# Patient Record
Sex: FEMALE | Race: BLACK OR AFRICAN AMERICAN | NOT HISPANIC OR LATINO | Employment: FULL TIME | URBAN - METROPOLITAN AREA
[De-identification: names, ages, dates, MRNs, and addresses within clinical notes are randomized per-mention and may not be internally consistent; named-entity substitution may affect disease eponyms.]

---

## 2022-12-14 ENCOUNTER — OFFICE VISIT (OUTPATIENT)
Dept: OBSTETRICS AND GYNECOLOGY | Facility: CLINIC | Age: 32
End: 2022-12-14
Payer: MEDICAID

## 2022-12-14 VITALS
HEART RATE: 61 BPM | BODY MASS INDEX: 20.6 KG/M2 | SYSTOLIC BLOOD PRESSURE: 100 MMHG | WEIGHT: 123.63 LBS | DIASTOLIC BLOOD PRESSURE: 63 MMHG | HEIGHT: 65 IN

## 2022-12-14 DIAGNOSIS — Z30.011 ENCOUNTER FOR INITIAL PRESCRIPTION OF CONTRACEPTIVE PILLS: Primary | ICD-10-CM

## 2022-12-14 PROCEDURE — 3078F PR MOST RECENT DIASTOLIC BLOOD PRESSURE < 80 MM HG: ICD-10-PCS | Mod: CPTII,S$GLB,, | Performed by: OBSTETRICS & GYNECOLOGY

## 2022-12-14 PROCEDURE — 3074F SYST BP LT 130 MM HG: CPT | Mod: CPTII,S$GLB,, | Performed by: OBSTETRICS & GYNECOLOGY

## 2022-12-14 PROCEDURE — 3008F BODY MASS INDEX DOCD: CPT | Mod: CPTII,S$GLB,, | Performed by: OBSTETRICS & GYNECOLOGY

## 2022-12-14 PROCEDURE — 3008F PR BODY MASS INDEX (BMI) DOCUMENTED: ICD-10-PCS | Mod: CPTII,S$GLB,, | Performed by: OBSTETRICS & GYNECOLOGY

## 2022-12-14 PROCEDURE — 99202 PR OFFICE/OUTPT VISIT, NEW, LEVL II, 15-29 MIN: ICD-10-PCS | Mod: S$GLB,,, | Performed by: OBSTETRICS & GYNECOLOGY

## 2022-12-14 PROCEDURE — 99202 OFFICE O/P NEW SF 15 MIN: CPT | Mod: S$GLB,,, | Performed by: OBSTETRICS & GYNECOLOGY

## 2022-12-14 PROCEDURE — 1159F MED LIST DOCD IN RCRD: CPT | Mod: CPTII,S$GLB,, | Performed by: OBSTETRICS & GYNECOLOGY

## 2022-12-14 PROCEDURE — 3078F DIAST BP <80 MM HG: CPT | Mod: CPTII,S$GLB,, | Performed by: OBSTETRICS & GYNECOLOGY

## 2022-12-14 PROCEDURE — 1159F PR MEDICATION LIST DOCUMENTED IN MEDICAL RECORD: ICD-10-PCS | Mod: CPTII,S$GLB,, | Performed by: OBSTETRICS & GYNECOLOGY

## 2022-12-14 PROCEDURE — 3074F PR MOST RECENT SYSTOLIC BLOOD PRESSURE < 130 MM HG: ICD-10-PCS | Mod: CPTII,S$GLB,, | Performed by: OBSTETRICS & GYNECOLOGY

## 2022-12-14 RX ORDER — NORETHINDRONE ACETATE AND ETHINYL ESTRADIOL 1MG-20(21)
1 KIT ORAL DAILY
Qty: 30 TABLET | Refills: 11 | Status: SHIPPED | OUTPATIENT
Start: 2022-12-14 | End: 2023-02-01 | Stop reason: SDUPTHER

## 2022-12-14 NOTE — PROGRESS NOTES
32-year-old female who is just had the Nexplanon removed wants to start on birth control pills just started her period 2 days ago I will put her on  balcoltra and will see her back when she is due for Pap she has no contraindications to the pill literature was given  Answers submitted by the patient for this visit:  Vaginal Discharge Questionnaire  (Submitted on 2022)  Chief Complaint: Vaginal discharge  Chronicity: recurrent  Onset: more than 1 month ago  Frequency: daily  Progression since onset: unchanged  Pain severity: no pain  Pregnant now?: No  abdominal pain: Yes  anorexia: No  back pain: No  chills: No  constipation: Yes  diarrhea: No  discolored urine: No  dysuria: No  fever: No  flank pain: Yes  frequency: No  headaches: No  hematuria: No  nausea: No  painful intercourse: No  rash: No  urgency: No  vomiting: No  Vaginal bleeding: typical of menses  Passing clots?: Yes  Passing tissue?: No  Aggravated by: nothing  treatments tried: heating pad, NSAIDs, warm bath  Improvement on treatment: no relief  Sexual activity: sexually active  Partner with STD symptoms: unknown  Birth control: condoms  Menstrual history: regular  STD: No  abdominal surgery: No   section: Yes  Ectopic pregnancy: No  Endometriosis: No  herpes simplex: No  gynecological surgery: No  menorrhagia: Yes  metrorrhagia: No  miscarriage: No  ovarian cysts: No  perineal abscess: No  PID: No  terminated pregnancy: No  vaginosis: No

## 2023-02-01 ENCOUNTER — OFFICE VISIT (OUTPATIENT)
Dept: OBSTETRICS AND GYNECOLOGY | Facility: CLINIC | Age: 33
End: 2023-02-01
Payer: MEDICAID

## 2023-02-01 VITALS
SYSTOLIC BLOOD PRESSURE: 115 MMHG | BODY MASS INDEX: 21.3 KG/M2 | WEIGHT: 128 LBS | DIASTOLIC BLOOD PRESSURE: 74 MMHG | HEART RATE: 90 BPM

## 2023-02-01 DIAGNOSIS — Z12.4 SCREENING FOR MALIGNANT NEOPLASM OF THE CERVIX: ICD-10-CM

## 2023-02-01 DIAGNOSIS — Z00.00 PHYSICAL EXAM, ANNUAL: ICD-10-CM

## 2023-02-01 DIAGNOSIS — Z01.419 WELL WOMAN EXAM WITH ROUTINE GYNECOLOGICAL EXAM: Primary | ICD-10-CM

## 2023-02-01 DIAGNOSIS — N76.0 VAGINOSIS: ICD-10-CM

## 2023-02-01 PROCEDURE — 3074F PR MOST RECENT SYSTOLIC BLOOD PRESSURE < 130 MM HG: ICD-10-PCS | Mod: CPTII,S$GLB,, | Performed by: OBSTETRICS & GYNECOLOGY

## 2023-02-01 PROCEDURE — 1159F PR MEDICATION LIST DOCUMENTED IN MEDICAL RECORD: ICD-10-PCS | Mod: CPTII,S$GLB,, | Performed by: OBSTETRICS & GYNECOLOGY

## 2023-02-01 PROCEDURE — 3008F BODY MASS INDEX DOCD: CPT | Mod: CPTII,S$GLB,, | Performed by: OBSTETRICS & GYNECOLOGY

## 2023-02-01 PROCEDURE — 1159F MED LIST DOCD IN RCRD: CPT | Mod: CPTII,S$GLB,, | Performed by: OBSTETRICS & GYNECOLOGY

## 2023-02-01 PROCEDURE — 3008F PR BODY MASS INDEX (BMI) DOCUMENTED: ICD-10-PCS | Mod: CPTII,S$GLB,, | Performed by: OBSTETRICS & GYNECOLOGY

## 2023-02-01 PROCEDURE — 99395 PR PREVENTIVE VISIT,EST,18-39: ICD-10-PCS | Mod: S$GLB,,, | Performed by: OBSTETRICS & GYNECOLOGY

## 2023-02-01 PROCEDURE — 3074F SYST BP LT 130 MM HG: CPT | Mod: CPTII,S$GLB,, | Performed by: OBSTETRICS & GYNECOLOGY

## 2023-02-01 PROCEDURE — 3078F PR MOST RECENT DIASTOLIC BLOOD PRESSURE < 80 MM HG: ICD-10-PCS | Mod: CPTII,S$GLB,, | Performed by: OBSTETRICS & GYNECOLOGY

## 2023-02-01 PROCEDURE — 99395 PREV VISIT EST AGE 18-39: CPT | Mod: S$GLB,,, | Performed by: OBSTETRICS & GYNECOLOGY

## 2023-02-01 PROCEDURE — 3078F DIAST BP <80 MM HG: CPT | Mod: CPTII,S$GLB,, | Performed by: OBSTETRICS & GYNECOLOGY

## 2023-02-01 RX ORDER — NORETHINDRONE ACETATE AND ETHINYL ESTRADIOL 1MG-20(21)
1 KIT ORAL DAILY
Qty: 30 TABLET | Refills: 11 | Status: SHIPPED | OUTPATIENT
Start: 2023-02-01 | End: 2024-02-01

## 2023-02-01 RX ORDER — METRONIDAZOLE 500 MG/1
500 TABLET ORAL EVERY 12 HOURS
Qty: 12 TABLET | Refills: 0 | Status: SHIPPED | OUTPATIENT
Start: 2023-02-01 | End: 2023-02-13

## 2023-02-01 NOTE — PROGRESS NOTES
Subjective:       Patient ID: Katherine Rivas is a 32 y.o. female.    Chief Complaint: Well Woman    A 32-year-old female here for annual examination she has no complaints does have little bit of an odor to a discharge every once a while is on birth control pills with no problems    Review of Systems   Constitutional:  Negative for activity change, appetite change, chills, fever and unexpected weight change.   HENT:  Negative for nasal congestion, dental problem, facial swelling, hearing loss and mouth sores.    Respiratory:  Negative for apnea, chest tightness, shortness of breath and wheezing.    Cardiovascular:  Negative for chest pain and leg swelling.   Gastrointestinal:  Negative for abdominal distention, abdominal pain, anal bleeding, blood in stool, constipation, diarrhea, nausea, rectal pain and vomiting.   Genitourinary:  Negative for decreased urine volume, difficulty urinating, dyspareunia, dysuria, enuresis, flank pain, frequency, genital sores, hematuria, menstrual problem, pelvic pain, urgency, vaginal bleeding, vaginal discharge and vaginal pain.   Musculoskeletal:  Negative for arthralgias, back pain, joint swelling, myalgias and neck pain.   Integumentary:  Negative for color change, pallor, rash and wound.   Allergic/Immunologic: Negative for immunocompromised state.   Neurological:  Negative for dizziness, light-headedness and headaches.   Hematological:  Negative for adenopathy. Does not bruise/bleed easily.   Psychiatric/Behavioral:  Negative for agitation, behavioral problems, confusion, sleep disturbance and suicidal ideas. The patient is not nervous/anxious and is not hyperactive.        Objective:      Physical Exam  Constitutional:       Appearance: She is well-developed.   HENT:      Head: Normocephalic.      Nose: Nose normal.   Eyes:      Conjunctiva/sclera: Conjunctivae normal.      Pupils: Pupils are equal, round, and reactive to light.   Cardiovascular:      Rate and Rhythm: Normal  rate and regular rhythm.      Heart sounds: Normal heart sounds.   Pulmonary:      Effort: Pulmonary effort is normal.      Breath sounds: Normal breath sounds.   Abdominal:      General: Bowel sounds are normal.      Palpations: Abdomen is soft.   Genitourinary:     Vagina: Normal.      Comments: Vulva vagina bimanual normal little bit of blood in the vaginal vault  Musculoskeletal:         General: Normal range of motion.      Cervical back: Normal range of motion and neck supple.   Skin:     General: Skin is warm and dry.   Neurological:      Mental Status: She is alert and oriented to person, place, and time.   Psychiatric:         Behavior: Behavior normal.         Thought Content: Thought content normal.     Breast without masses  Assessment:     Bacterial vaginosis  Problem List Items Addressed This Visit    None      Plan:         Flagyl 500 b.i.d. for 6 days

## 2023-02-02 LAB — Lab: NORMAL

## 2023-02-06 ENCOUNTER — PATIENT MESSAGE (OUTPATIENT)
Dept: OBSTETRICS AND GYNECOLOGY | Facility: CLINIC | Age: 33
End: 2023-02-06
Payer: MEDICAID

## 2023-04-10 ENCOUNTER — TELEPHONE (OUTPATIENT)
Dept: OBSTETRICS AND GYNECOLOGY | Facility: CLINIC | Age: 33
End: 2023-04-10
Payer: MEDICAID

## 2023-04-27 DIAGNOSIS — Z34.91 FIRST TRIMESTER PREGNANCY: Primary | ICD-10-CM

## 2023-05-01 ENCOUNTER — PROCEDURE VISIT (OUTPATIENT)
Dept: OBSTETRICS AND GYNECOLOGY | Facility: CLINIC | Age: 33
End: 2023-05-01
Payer: MEDICAID

## 2023-05-01 DIAGNOSIS — Z34.91 FIRST TRIMESTER PREGNANCY: ICD-10-CM

## 2023-05-01 PROCEDURE — 76801 OB US < 14 WKS SINGLE FETUS: CPT | Mod: S$GLB,,, | Performed by: STUDENT IN AN ORGANIZED HEALTH CARE EDUCATION/TRAINING PROGRAM

## 2023-05-01 PROCEDURE — 76801 US OB/GYN PROCEDURE (VIEWPOINT): ICD-10-PCS | Mod: S$GLB,,, | Performed by: STUDENT IN AN ORGANIZED HEALTH CARE EDUCATION/TRAINING PROGRAM

## 2023-05-22 ENCOUNTER — ROUTINE PRENATAL (OUTPATIENT)
Dept: OBSTETRICS AND GYNECOLOGY | Facility: CLINIC | Age: 33
End: 2023-05-22
Payer: MEDICAID

## 2023-05-22 VITALS
BODY MASS INDEX: 20.97 KG/M2 | WEIGHT: 126 LBS | SYSTOLIC BLOOD PRESSURE: 100 MMHG | HEART RATE: 72 BPM | DIASTOLIC BLOOD PRESSURE: 64 MMHG

## 2023-05-22 DIAGNOSIS — Z34.81 ENCOUNTER FOR SUPERVISION OF OTHER NORMAL PREGNANCY IN FIRST TRIMESTER: ICD-10-CM

## 2023-05-22 DIAGNOSIS — Z11.3 SCREENING EXAMINATION FOR VENEREAL DISEASE: ICD-10-CM

## 2023-05-22 DIAGNOSIS — Z12.4 SCREENING FOR MALIGNANT NEOPLASM OF THE CERVIX: Primary | ICD-10-CM

## 2023-05-22 DIAGNOSIS — Z98.891 H/O: C-SECTION: ICD-10-CM

## 2023-05-22 PROBLEM — Z34.91 ENCOUNTER FOR SUPERVISION OF NORMAL PREGNANCY IN FIRST TRIMESTER: Status: ACTIVE | Noted: 2023-05-22

## 2023-05-22 PROCEDURE — 99203 PR OFFICE/OUTPT VISIT, NEW, LEVL III, 30-44 MIN: ICD-10-PCS | Mod: TH,S$GLB,, | Performed by: STUDENT IN AN ORGANIZED HEALTH CARE EDUCATION/TRAINING PROGRAM

## 2023-05-22 PROCEDURE — 99203 OFFICE O/P NEW LOW 30 MIN: CPT | Mod: TH,S$GLB,, | Performed by: STUDENT IN AN ORGANIZED HEALTH CARE EDUCATION/TRAINING PROGRAM

## 2023-05-23 LAB
CHLAMYDIA: NEGATIVE
GONORRHEA: NEGATIVE
SOURCE: NORMAL
SOURCE: NORMAL
TRICHOMONAS AMPLIFIED: NEGATIVE

## 2023-05-30 ENCOUNTER — TELEPHONE (OUTPATIENT)
Dept: OBSTETRICS AND GYNECOLOGY | Facility: CLINIC | Age: 33
End: 2023-05-30
Payer: MEDICAID

## 2023-05-30 NOTE — TELEPHONE ENCOUNTER
Pt calling in regards to NIPT testing. I informed pt that everything came back negative and gender was verbalized to a friend. Pt v/u    ----- Message from Olivia Rendon sent at 5/30/2023  8:38 AM CDT -----  Contact: self  Type:  Patient Returning Call    Who Called:Katherine Rivas  Who Left Message for Patient:jacinta  Does the patient know what this is regarding?:results  Would the patient rather a call back or a response via [facility]? Call back  Best Call Back Number:[phone]  Additional Information: n/a

## 2023-06-20 ENCOUNTER — ROUTINE PRENATAL (OUTPATIENT)
Dept: OBSTETRICS AND GYNECOLOGY | Facility: CLINIC | Age: 33
End: 2023-06-20
Payer: MEDICAID

## 2023-06-20 VITALS
HEART RATE: 77 BPM | DIASTOLIC BLOOD PRESSURE: 64 MMHG | WEIGHT: 129 LBS | SYSTOLIC BLOOD PRESSURE: 104 MMHG | BODY MASS INDEX: 21.47 KG/M2

## 2023-06-20 DIAGNOSIS — Z34.82 ENCOUNTER FOR SUPERVISION OF OTHER NORMAL PREGNANCY IN SECOND TRIMESTER: Primary | ICD-10-CM

## 2023-06-20 DIAGNOSIS — Z3A.15 15 WEEKS GESTATION OF PREGNANCY: ICD-10-CM

## 2023-06-20 PROCEDURE — 99213 PR OFFICE/OUTPT VISIT, EST, LEVL III, 20-29 MIN: ICD-10-PCS | Mod: TH,S$GLB,, | Performed by: STUDENT IN AN ORGANIZED HEALTH CARE EDUCATION/TRAINING PROGRAM

## 2023-06-20 PROCEDURE — 99213 OFFICE O/P EST LOW 20 MIN: CPT | Mod: TH,S$GLB,, | Performed by: STUDENT IN AN ORGANIZED HEALTH CARE EDUCATION/TRAINING PROGRAM

## 2023-06-20 NOTE — PROGRESS NOTES
CC: Follow-Up OB    HPI:   32 y.o.  at 15w2d with EDC of 12/10/2023, by Ultrasound, here for her follow up OB visit. Complains of nothing today.    Pregnancy ROS:  Negative leakage of fluid   Negative vaginal bleeding   Negative headache, vision changes, RUQ pain, epigastric pain  Negative contractions/abdominal pain   Negative pelvic pressure     Physical Exam:  Prenatal Vitals  BP: 104/64  Weight: 58.5 kg (129 lb)    Wt Readings from Last 3 Encounters:   23 58.5 kg (129 lb)   23 57.2 kg (126 lb)   23 58.1 kg (128 lb)     Body mass index is 21.47 kg/m².    General: NAD, well developed, well nourished  Psych: alert and oriented to person, time and place, normal affect  HEENT: normocephalic, atraumatic  Abd: Gravid, soft, NT, ND  Skin: warm, dry  Neuro: normal gait, gross motor function intact  No cyanosis, clubbing or edema    FHTs: +    Maternal Blood Type: B+/-    ASSESSMENT: 32 y.o.  @ 15w2d with   Patient Active Problem List   Diagnosis    Encounter for supervision of normal pregnancy in first trimester    H/O:        PLAN:  NIPT negative, AFP today  Pain, fever, bleeding precautions  AMADOU, ANITHA, PreE precautions  Cont PNV, Iron  Return to clinic in 4 weeks

## 2023-06-22 LAB
AFP MOM: 1.13
AFP, SERUM: 43.9 NG/ML
CALC'D GESTATIONAL AGE: 15.3 WEEKS
COLLECTION DATE: NORMAL
COMMENT: NORMAL
DATE OF BIRTH: NORMAL
DONOR AGE: EGG RETRIEVAL: NORMAL
DONOR EGG: NO
EDD DETERMINED BY: NORMAL
EST'D DATE OF DELIVERY: NORMAL
HX OF NEURAL TUBE DEFECTS: NO
INSULIN DEPEND DIABETIC: NO
INTERPRETATION: NORMAL
Lab: NORMAL
MATERNAL WEIGHT: 129 LBS
MOTHER'S ETHNIC ORIGIN: NORMAL
NUMBER OF FETUSES: 1
PREV PREGNANCY DOWN SYND: NO
REPEAT SPECIMEN: NO
RISK FOR ONTD: NORMAL

## 2023-06-25 ENCOUNTER — PATIENT MESSAGE (OUTPATIENT)
Dept: OTHER | Facility: OTHER | Age: 33
End: 2023-06-25
Payer: MEDICAID

## 2023-06-27 ENCOUNTER — PATIENT MESSAGE (OUTPATIENT)
Dept: RESEARCH | Facility: HOSPITAL | Age: 33
End: 2023-06-27
Payer: MEDICAID

## 2023-06-30 LAB
ABS NRBC COUNT: 0 X 10 3/UL (ref 0–0.01)
ABSOLUTE BASOPHIL: 0.01 X 10 3/UL (ref 0–0.22)
ABSOLUTE EOSINOPHIL: 0.01 X 10 3/UL (ref 0.04–0.54)
ABSOLUTE IMMATURE GRAN: 0.02 X 10 3/UL (ref 0–0.04)
ABSOLUTE LYMPHOCYTE: 1.74 X 10 3/UL (ref 0.86–4.75)
ABSOLUTE MONOCYTE: 0.48 X 10 3/UL (ref 0.22–1.08)
AMPHETAMINES (500): NEGATIVE NG/ML
ANTIBODY SCREEN: NEGATIVE
BARBITURATES (200): NEGATIVE NG/ML
BASOPHILS NFR BLD: 0.1 % (ref 0.2–1.2)
BENZODIAZEPINES: NEGATIVE NG/ML
BLOOD GROUPING: NORMAL
BLOOD TYPE (D): POSITIVE
COCAINE (150): NEGATIVE NG/ML
EOSINOPHIL NFR BLD: 0.1 % (ref 0.7–7)
HBV SURFACE AG SERPL QL IA: NONREACTIVE
HCT VFR BLD AUTO: 35.7 % (ref 37–47)
HCV IGG SERPL QL IA: NONREACTIVE
HGB BLD-MCNC: 11.8 G/DL (ref 12–16)
HIV 1+2 AB+HIV1 P24 AG SERPL QL IA: NONREACTIVE
IMMATURE GRANULOCYTES: 0.2 % (ref 0–0.5)
LYMPHOCYTES NFR BLD: 20.9 % (ref 19.3–53.1)
MARIJUANA, THC (50): NEGATIVE NG/ML
MCH RBC QN AUTO: 30.3 PG (ref 27–32)
MCHC RBC AUTO-ENTMCNC: 33.1 G/DL (ref 32–36)
MCV RBC AUTO: 91.5 FL (ref 82–100)
METHADONE: NEGATIVE NG/ML
MONOCYTES NFR BLD: 5.8 % (ref 4.7–12.5)
NEUTROPHILS # BLD AUTO: 6.08 X 10 3/UL (ref 2.15–7.56)
NEUTROPHILS NFR BLD: 72.9 % (ref 34–71.1)
NUCLEATED RED BLOOD CELLS: 0 /100 WBC (ref 0–0.2)
OPIATES: NEGATIVE NG/ML
OXYCODONE: NEGATIVE NG/ML
PH: 5.5 (ref 4.5–8)
PHENCYCLIDINE (25): NEGATIVE NG/ML
PLATELET # BLD AUTO: 261 X 10 3/UL (ref 135–400)
RBC # BLD AUTO: 3.9 X 10 6/UL (ref 4.2–5.4)
RDW-SD: 45.2 FL (ref 37–54)
RUBELLA IGG SCREEN: NORMAL
SICKLE CELL PREP: NEGATIVE
SYPHILIS TREPONEMAL ANTIBODY: NONREACTIVE
URINE CREATININE D/S: 179.6 MG/DL
URINE CULTURE, ROUTINE: NORMAL
WBC # BLD: 8.34 X 10 3/UL (ref 4.3–10.8)

## 2023-07-02 ENCOUNTER — PATIENT MESSAGE (OUTPATIENT)
Dept: OTHER | Facility: OTHER | Age: 33
End: 2023-07-02
Payer: MEDICAID

## 2023-07-05 ENCOUNTER — PATIENT MESSAGE (OUTPATIENT)
Dept: RESEARCH | Facility: HOSPITAL | Age: 33
End: 2023-07-05
Payer: MEDICAID

## 2023-07-21 ENCOUNTER — ROUTINE PRENATAL (OUTPATIENT)
Dept: OBSTETRICS AND GYNECOLOGY | Facility: CLINIC | Age: 33
End: 2023-07-21
Payer: MEDICAID

## 2023-07-21 VITALS
DIASTOLIC BLOOD PRESSURE: 60 MMHG | HEART RATE: 86 BPM | SYSTOLIC BLOOD PRESSURE: 94 MMHG | WEIGHT: 136 LBS | BODY MASS INDEX: 22.63 KG/M2

## 2023-07-21 DIAGNOSIS — Z34.82 ENCOUNTER FOR SUPERVISION OF OTHER NORMAL PREGNANCY IN SECOND TRIMESTER: Primary | ICD-10-CM

## 2023-07-21 DIAGNOSIS — Z3A.19 19 WEEKS GESTATION OF PREGNANCY: ICD-10-CM

## 2023-07-21 PROCEDURE — 99213 PR OFFICE/OUTPT VISIT, EST, LEVL III, 20-29 MIN: ICD-10-PCS | Mod: TH,S$GLB,, | Performed by: STUDENT IN AN ORGANIZED HEALTH CARE EDUCATION/TRAINING PROGRAM

## 2023-07-21 PROCEDURE — 99213 OFFICE O/P EST LOW 20 MIN: CPT | Mod: TH,S$GLB,, | Performed by: STUDENT IN AN ORGANIZED HEALTH CARE EDUCATION/TRAINING PROGRAM

## 2023-07-21 NOTE — PROGRESS NOTES
CC: Follow-Up OB    HPI:   32 y.o.  at 19w5d with EDC of 12/10/2023, by Ultrasound, here for her follow up OB visit. Complains of nothing today.    Pregnancy ROS:  Negative leakage of fluid   Negative vaginal bleeding   Negative headache, vision changes, RUQ pain, epigastric pain  Negative contractions/abdominal pain   Negative pelvic pressure     Physical Exam:  Prenatal Vitals  BP: 94/60  Weight: 61.7 kg (136 lb)  Urine Glucose: Negative  Urine Albumin: Negative    Wt Readings from Last 3 Encounters:   23 61.7 kg (136 lb)   23 58.5 kg (129 lb)   23 57.2 kg (126 lb)       Body mass index is 22.63 kg/m².    General: NAD, well developed, well nourished  Psych: alert and oriented to person, time and place, normal affect  HEENT: normocephalic, atraumatic  Abd: Gravid, soft, NT, ND  Skin: warm, dry  Neuro: normal gait, gross motor function intact  No cyanosis, clubbing or edema    FHTs: +    Maternal Blood Type: B+/-    ASSESSMENT: 32 y.o.  @ 19w5d with   Patient Active Problem List   Diagnosis    Encounter for supervision of normal pregnancy in first trimester    H/O:        PLAN:  AFP today  Anatomy scan at 22 weeks  Pain, fever, bleeding precautions  AMADOU, ANITHA, PreE precautions  Cont PNV, Iron  Return to clinic in 3 weeks

## 2023-07-23 ENCOUNTER — PATIENT MESSAGE (OUTPATIENT)
Dept: OTHER | Facility: OTHER | Age: 33
End: 2023-07-23
Payer: MEDICAID

## 2023-08-17 DIAGNOSIS — Z34.82 ENCOUNTER FOR SUPERVISION OF OTHER NORMAL PREGNANCY IN SECOND TRIMESTER: Primary | ICD-10-CM

## 2023-08-18 ENCOUNTER — ROUTINE PRENATAL (OUTPATIENT)
Dept: OBSTETRICS AND GYNECOLOGY | Facility: CLINIC | Age: 33
End: 2023-08-18
Payer: MEDICAID

## 2023-08-18 ENCOUNTER — PROCEDURE VISIT (OUTPATIENT)
Dept: OBSTETRICS AND GYNECOLOGY | Facility: CLINIC | Age: 33
End: 2023-08-18
Payer: MEDICAID

## 2023-08-18 VITALS
WEIGHT: 142 LBS | BODY MASS INDEX: 23.63 KG/M2 | HEART RATE: 92 BPM | DIASTOLIC BLOOD PRESSURE: 67 MMHG | SYSTOLIC BLOOD PRESSURE: 110 MMHG

## 2023-08-18 DIAGNOSIS — Z34.82 ENCOUNTER FOR SUPERVISION OF OTHER NORMAL PREGNANCY IN SECOND TRIMESTER: Primary | ICD-10-CM

## 2023-08-18 DIAGNOSIS — Z3A.23 23 WEEKS GESTATION OF PREGNANCY: ICD-10-CM

## 2023-08-18 DIAGNOSIS — Z34.82 ENCOUNTER FOR SUPERVISION OF OTHER NORMAL PREGNANCY IN SECOND TRIMESTER: ICD-10-CM

## 2023-08-18 PROCEDURE — 99213 PR OFFICE/OUTPT VISIT, EST, LEVL III, 20-29 MIN: ICD-10-PCS | Mod: TH,25,S$GLB, | Performed by: STUDENT IN AN ORGANIZED HEALTH CARE EDUCATION/TRAINING PROGRAM

## 2023-08-18 PROCEDURE — 76805 OB US >/= 14 WKS SNGL FETUS: CPT | Mod: S$GLB,,, | Performed by: STUDENT IN AN ORGANIZED HEALTH CARE EDUCATION/TRAINING PROGRAM

## 2023-08-18 PROCEDURE — 99213 OFFICE O/P EST LOW 20 MIN: CPT | Mod: TH,25,S$GLB, | Performed by: STUDENT IN AN ORGANIZED HEALTH CARE EDUCATION/TRAINING PROGRAM

## 2023-08-18 PROCEDURE — 76805 US OB/GYN PROCEDURE (VIEWPOINT): ICD-10-PCS | Mod: S$GLB,,, | Performed by: STUDENT IN AN ORGANIZED HEALTH CARE EDUCATION/TRAINING PROGRAM

## 2023-08-18 NOTE — PROGRESS NOTES
CC: Follow-Up OB    HPI:   32 y.o.  at 23w5d with EDC of 12/10/2023, by Ultrasound, here for her follow up OB visit. Complains of nothing today.    Pregnancy ROS:  Positive fetal movement   Negative leakage of fluid   Negative vaginal bleeding   Negative headache, vision changes, RUQ pain, epigastric pain  Negative contractions/abdominal pain   Negative pelvic pressure     Physical Exam:  Prenatal Vitals  BP: 110/67  Weight: 64.4 kg (142 lb)    Wt Readings from Last 3 Encounters:   23 64.4 kg (142 lb)   23 61.7 kg (136 lb)   23 58.5 kg (129 lb)       Body mass index is 23.63 kg/m².    General: NAD, well developed, well nourished  Psych: alert and oriented to person, time and place, normal affect  HEENT: normocephalic, atraumatic  Abd: Gravid, soft, NT, ND  Skin: warm, dry  Neuro: normal gait, gross motor function intact  No cyanosis, clubbing or edema    FHTs: +    Maternal Blood Type: B+/-    ASSESSMENT: 32 y.o.  @ 23w5d with   Patient Active Problem List   Diagnosis    Encounter for supervision of normal pregnancy in first trimester    H/O:      PLAN:  Anatomy scan performed  Osull on RTC  Pain, fever, bleeding precautions  AMADOU, ANITHA, PreE precautions  Cont PNV, Iron  Return to clinic in 3 weeks

## 2023-08-20 ENCOUNTER — PATIENT MESSAGE (OUTPATIENT)
Dept: OTHER | Facility: OTHER | Age: 33
End: 2023-08-20
Payer: MEDICAID

## 2023-09-03 ENCOUNTER — PATIENT MESSAGE (OUTPATIENT)
Dept: OTHER | Facility: OTHER | Age: 33
End: 2023-09-03
Payer: MEDICAID

## 2023-09-15 ENCOUNTER — ROUTINE PRENATAL (OUTPATIENT)
Dept: OBSTETRICS AND GYNECOLOGY | Facility: CLINIC | Age: 33
End: 2023-09-15
Payer: MEDICAID

## 2023-09-15 VITALS
WEIGHT: 141 LBS | SYSTOLIC BLOOD PRESSURE: 110 MMHG | BODY MASS INDEX: 23.46 KG/M2 | HEART RATE: 89 BPM | DIASTOLIC BLOOD PRESSURE: 72 MMHG

## 2023-09-15 DIAGNOSIS — Z34.82 ENCOUNTER FOR SUPERVISION OF OTHER NORMAL PREGNANCY IN SECOND TRIMESTER: Primary | ICD-10-CM

## 2023-09-15 DIAGNOSIS — Z3A.27 27 WEEKS GESTATION OF PREGNANCY: ICD-10-CM

## 2023-09-15 LAB — GLUCOSE 1 HR POST 50 GM: 432 MG/DL

## 2023-09-15 PROCEDURE — 99213 OFFICE O/P EST LOW 20 MIN: CPT | Mod: TH,S$GLB,, | Performed by: STUDENT IN AN ORGANIZED HEALTH CARE EDUCATION/TRAINING PROGRAM

## 2023-09-15 PROCEDURE — 99213 PR OFFICE/OUTPT VISIT, EST, LEVL III, 20-29 MIN: ICD-10-PCS | Mod: TH,S$GLB,, | Performed by: STUDENT IN AN ORGANIZED HEALTH CARE EDUCATION/TRAINING PROGRAM

## 2023-09-15 NOTE — PROGRESS NOTES
CC: Follow-Up OB    HPI:   32 y.o.  at 27w5d with EDC of 12/10/2023, by Ultrasound, here for her follow up OB visit. Complains of nothing today.    Pregnancy ROS:  Positive fetal movement   Negative leakage of fluid   Negative vaginal bleeding   Negative headache, vision changes, RUQ pain, epigastric pain  Negative contractions/abdominal pain   Negative pelvic pressure     Physical Exam:  Prenatal Vitals  BP: 110/72  Weight: 64 kg (141 lb)    Wt Readings from Last 3 Encounters:   09/15/23 64 kg (141 lb)   23 64.4 kg (142 lb)   23 61.7 kg (136 lb)       Body mass index is 23.46 kg/m².    General: NAD, well developed, well nourished  Psych: alert and oriented to person, time and place, normal affect  HEENT: normocephalic, atraumatic  Abd: Gravid, soft, NT, ND  Skin: warm, dry  Neuro: normal gait, gross motor function intact  No cyanosis, clubbing or edema    FHTs: +    Maternal Blood Type: B+/-    ASSESSMENT: 32 y.o.  @ 27w5d with   Patient Active Problem List   Diagnosis    Encounter for supervision of normal pregnancy in first trimester    H/O:        PLAN:  Osull today  3rd trimester labs and tdap on RTC  Pain, fever, bleeding precautions  AMADOU, ANITHA, PreE precautions  Cont PNV, Iron  Return to clinic in 3 weeks

## 2023-09-17 ENCOUNTER — PATIENT MESSAGE (OUTPATIENT)
Dept: OTHER | Facility: OTHER | Age: 33
End: 2023-09-17
Payer: MEDICAID

## 2023-09-18 ENCOUNTER — PATIENT MESSAGE (OUTPATIENT)
Dept: OBSTETRICS AND GYNECOLOGY | Facility: CLINIC | Age: 33
End: 2023-09-18
Payer: MEDICAID

## 2023-09-18 DIAGNOSIS — O99.810 ABNORMAL O'SULLIVAN GLUCOSE CHALLENGE TEST, ANTEPARTUM: Primary | ICD-10-CM

## 2023-09-18 LAB
ESTIMATED AVERAGE GLUCOSE: 193 MG/DL
HBA1C MFR BLD: 8.4 % (ref 4–6)

## 2023-09-19 ENCOUNTER — PATIENT MESSAGE (OUTPATIENT)
Dept: OBSTETRICS AND GYNECOLOGY | Facility: CLINIC | Age: 33
End: 2023-09-19

## 2023-09-19 ENCOUNTER — ROUTINE PRENATAL (OUTPATIENT)
Dept: OBSTETRICS AND GYNECOLOGY | Facility: CLINIC | Age: 33
End: 2023-09-19
Payer: MEDICAID

## 2023-09-19 VITALS
DIASTOLIC BLOOD PRESSURE: 81 MMHG | SYSTOLIC BLOOD PRESSURE: 114 MMHG | HEART RATE: 84 BPM | WEIGHT: 137 LBS | BODY MASS INDEX: 22.8 KG/M2

## 2023-09-19 DIAGNOSIS — O09.93 SUPERVISION OF HIGH RISK PREGNANCY IN THIRD TRIMESTER: Primary | ICD-10-CM

## 2023-09-19 DIAGNOSIS — Z3A.28 28 WEEKS GESTATION OF PREGNANCY: ICD-10-CM

## 2023-09-19 PROCEDURE — 99213 PR OFFICE/OUTPT VISIT, EST, LEVL III, 20-29 MIN: ICD-10-PCS | Mod: TH,S$GLB,, | Performed by: STUDENT IN AN ORGANIZED HEALTH CARE EDUCATION/TRAINING PROGRAM

## 2023-09-19 PROCEDURE — 99213 OFFICE O/P EST LOW 20 MIN: CPT | Mod: TH,S$GLB,, | Performed by: STUDENT IN AN ORGANIZED HEALTH CARE EDUCATION/TRAINING PROGRAM

## 2023-09-19 RX ORDER — INSULIN HUMAN 100 [IU]/ML
INJECTION, SUSPENSION SUBCUTANEOUS
Qty: 10 ML | Refills: 6 | Status: SHIPPED | OUTPATIENT
Start: 2023-09-19 | End: 2023-10-10

## 2023-09-19 NOTE — PROGRESS NOTES
CC: Follow-Up OB    HPI:   32 y.o.  at 28w2d with EDC of 12/10/2023, by Ultrasound, here for her follow up OB visit. Complains of nothing today. Pt with elevated osull of 432.     Pregnancy ROS:  Positive fetal movement   Negative leakage of fluid   Negative vaginal bleeding   Negative headache, vision changes, RUQ pain, epigastric pain  Negative contractions/abdominal pain   Negative pelvic pressure     Physical Exam:  Prenatal Vitals  BP: 114/81  Weight: 62.1 kg (137 lb)    Wt Readings from Last 3 Encounters:   23 62.1 kg (137 lb)   09/15/23 64 kg (141 lb)   23 64.4 kg (142 lb)     Body mass index is 22.8 kg/m².    General: NAD, well developed, well nourished  Psych: alert and oriented to person, time and place, normal affect  HEENT: normocephalic, atraumatic  Abd: Gravid, soft, NT, ND  Skin: warm, dry  Neuro: normal gait, gross motor function intact  No cyanosis, clubbing or edema    FHTs: 150s    Maternal Blood Type: B+/-    ASSESSMENT: 32 y.o.  @ 28w2d with   Patient Active Problem List   Diagnosis    Encounter for supervision of normal pregnancy in first trimester    H/O:     Abnormal O'Sawyer glucose challenge test, antepartum       PLAN:  3rd trimester labs and tdap today  Osull elevated, pt with DM, given testing supplies, and appt with DM education  Counseled on QID glucose checks, ADA diet  Will refer to MFM   Will start insulin today, NPH: 26/10, Re/10  Pain, fever, bleeding precautions  AMADOU, ANITHA, PreE precautions  Cont PNV, Iron  Return to clinic in 1 weeks

## 2023-09-21 ENCOUNTER — OUTSIDE PLACE OF SERVICE (OUTPATIENT)
Dept: OBSTETRICS AND GYNECOLOGY | Facility: CLINIC | Age: 33
End: 2023-09-21
Payer: MEDICAID

## 2023-09-21 ENCOUNTER — OUTSIDE PLACE OF SERVICE (OUTPATIENT)
Dept: MATERNAL FETAL MEDICINE | Facility: CLINIC | Age: 33
End: 2023-09-21
Payer: MEDICAID

## 2023-09-21 PROCEDURE — 99232 SBSQ HOSP IP/OBS MODERATE 35: CPT | Mod: ,,, | Performed by: OBSTETRICS & GYNECOLOGY

## 2023-09-21 PROCEDURE — 99232 PR SUBSEQUENT HOSPITAL CARE,LEVL II: ICD-10-PCS | Mod: ,,, | Performed by: OBSTETRICS & GYNECOLOGY

## 2023-09-21 PROCEDURE — 99222 PR INITIAL HOSPITAL CARE,LEVL II: ICD-10-PCS | Mod: ,,, | Performed by: STUDENT IN AN ORGANIZED HEALTH CARE EDUCATION/TRAINING PROGRAM

## 2023-09-21 PROCEDURE — 99222 1ST HOSP IP/OBS MODERATE 55: CPT | Mod: ,,, | Performed by: STUDENT IN AN ORGANIZED HEALTH CARE EDUCATION/TRAINING PROGRAM

## 2023-09-22 PROCEDURE — 99238 PR HOSPITAL DISCHARGE DAY,<30 MIN: ICD-10-PCS | Mod: ,,, | Performed by: STUDENT IN AN ORGANIZED HEALTH CARE EDUCATION/TRAINING PROGRAM

## 2023-09-22 PROCEDURE — 99238 HOSP IP/OBS DSCHRG MGMT 30/<: CPT | Mod: ,,, | Performed by: STUDENT IN AN ORGANIZED HEALTH CARE EDUCATION/TRAINING PROGRAM

## 2023-09-25 ENCOUNTER — PATIENT MESSAGE (OUTPATIENT)
Dept: OBSTETRICS AND GYNECOLOGY | Facility: CLINIC | Age: 33
End: 2023-09-25
Payer: MEDICAID

## 2023-09-25 DIAGNOSIS — O24.414 INSULIN CONTROLLED GESTATIONAL DIABETES MELLITUS (GDM) IN THIRD TRIMESTER: Primary | ICD-10-CM

## 2023-09-28 ENCOUNTER — PATIENT MESSAGE (OUTPATIENT)
Dept: OBSTETRICS AND GYNECOLOGY | Facility: CLINIC | Age: 33
End: 2023-09-28
Payer: MEDICAID

## 2023-09-29 ENCOUNTER — ROUTINE PRENATAL (OUTPATIENT)
Dept: OBSTETRICS AND GYNECOLOGY | Facility: CLINIC | Age: 33
End: 2023-09-29
Payer: MEDICAID

## 2023-09-29 VITALS
HEART RATE: 87 BPM | SYSTOLIC BLOOD PRESSURE: 98 MMHG | BODY MASS INDEX: 24.46 KG/M2 | WEIGHT: 147 LBS | DIASTOLIC BLOOD PRESSURE: 65 MMHG

## 2023-09-29 DIAGNOSIS — Z3A.29 29 WEEKS GESTATION OF PREGNANCY: ICD-10-CM

## 2023-09-29 DIAGNOSIS — O24.414 INSULIN CONTROLLED GESTATIONAL DIABETES MELLITUS (GDM) IN THIRD TRIMESTER: ICD-10-CM

## 2023-09-29 DIAGNOSIS — O09.93 SUPERVISION OF HIGH RISK PREGNANCY IN THIRD TRIMESTER: Primary | ICD-10-CM

## 2023-09-29 PROCEDURE — 99213 PR OFFICE/OUTPT VISIT, EST, LEVL III, 20-29 MIN: ICD-10-PCS | Mod: TH,S$GLB,, | Performed by: STUDENT IN AN ORGANIZED HEALTH CARE EDUCATION/TRAINING PROGRAM

## 2023-09-29 PROCEDURE — 99213 OFFICE O/P EST LOW 20 MIN: CPT | Mod: TH,S$GLB,, | Performed by: STUDENT IN AN ORGANIZED HEALTH CARE EDUCATION/TRAINING PROGRAM

## 2023-09-29 NOTE — PROGRESS NOTES
CC: Follow-Up OB    HPI:   32 y.o.  at 29w5d with EDC of 12/10/2023, by Ultrasound, here for her follow up OB visit. Complains of nothing today. Fasting glucose avg 95, 2 hr PP values avg 130s.    Pregnancy ROS:  Positive fetal movement   Negative leakage of fluid   Negative vaginal bleeding   Negative headache, vision changes, RUQ pain, epigastric pain  Negative contractions/abdominal pain   Negative pelvic pressure     Physical Exam:  Prenatal Vitals  BP: 98/65  Weight: 66.7 kg (147 lb)    Wt Readings from Last 3 Encounters:   23 66.7 kg (147 lb)   23 62.1 kg (137 lb)   09/15/23 64 kg (141 lb)       Body mass index is 24.46 kg/m².    General: NAD, well developed, well nourished  Psych: alert and oriented to person, time and place, normal affect  HEENT: normocephalic, atraumatic  Abd: Gravid, soft, NT, ND  Skin: warm, dry  Neuro: normal gait, gross motor function intact  No cyanosis, clubbing or edema    FHTs: +    Maternal Blood Type: B+/-    ASSESSMENT: 32 y.o.  @ 29w5d with   Patient Active Problem List   Diagnosis    Encounter for supervision of normal pregnancy in first trimester    H/O:     Abnormal O'Sawyer glucose challenge test, antepartum       PLAN:  Continue QID glucose checks, ADA diet  Will increase insulin regimen today  Pain, fever, bleeding precautions  AMADOU, ANITHA, PreE precautions  Cont PNV, Iron  Return to clinic in 1 weeks

## 2023-10-01 ENCOUNTER — PATIENT MESSAGE (OUTPATIENT)
Dept: OTHER | Facility: OTHER | Age: 33
End: 2023-10-01
Payer: MEDICAID

## 2023-10-05 ENCOUNTER — PROCEDURE VISIT (OUTPATIENT)
Dept: MATERNAL FETAL MEDICINE | Facility: CLINIC | Age: 33
End: 2023-10-05
Payer: MEDICAID

## 2023-10-05 VITALS
BODY MASS INDEX: 23.63 KG/M2 | WEIGHT: 142 LBS | DIASTOLIC BLOOD PRESSURE: 52 MMHG | OXYGEN SATURATION: 99 % | SYSTOLIC BLOOD PRESSURE: 100 MMHG | HEART RATE: 85 BPM | RESPIRATION RATE: 20 BRPM

## 2023-10-05 DIAGNOSIS — O24.414 INSULIN CONTROLLED GESTATIONAL DIABETES MELLITUS (GDM) IN THIRD TRIMESTER: ICD-10-CM

## 2023-10-05 PROCEDURE — 76819 PR US, OB, FETAL BIOPHYSICAL, W/O NST: ICD-10-PCS | Mod: S$GLB,,, | Performed by: OBSTETRICS & GYNECOLOGY

## 2023-10-05 PROCEDURE — 76811 OB US DETAILED SNGL FETUS: CPT | Mod: S$GLB,,, | Performed by: OBSTETRICS & GYNECOLOGY

## 2023-10-05 PROCEDURE — 76819 FETAL BIOPHYS PROFIL W/O NST: CPT | Mod: S$GLB,,, | Performed by: OBSTETRICS & GYNECOLOGY

## 2023-10-05 PROCEDURE — 76811 PR US, OB FETAL EVAL & EXAM, TRANSABDOM,FIRST GESTATION: ICD-10-PCS | Mod: S$GLB,,, | Performed by: OBSTETRICS & GYNECOLOGY

## 2023-10-05 PROCEDURE — 99213 OFFICE O/P EST LOW 20 MIN: CPT | Mod: 25,TH,S$GLB, | Performed by: OBSTETRICS & GYNECOLOGY

## 2023-10-05 PROCEDURE — 99213 PR OFFICE/OUTPT VISIT, EST, LEVL III, 20-29 MIN: ICD-10-PCS | Mod: 25,TH,S$GLB, | Performed by: OBSTETRICS & GYNECOLOGY

## 2023-10-05 NOTE — PROGRESS NOTES
Katherine is here for followup MFM consultation for likely Type 1 diabetes in pregnancy, referred by Dr. Del Valle.    She is feeling fetal movement.    Katherine denies vaginal bleeding, loss of fluid, recurrent contractions.    She did bring her glucose log today; states Dr. Del Valle is monitoring her glucose levels at office visits.  She is taking medications for control,    Insulin:     48 units N and 30 units R before breakfast       28 units R before dinner       26 units N at HS        Vitals:    10/05/23 1339   BP: (!) 100/52   Pulse: 85   Resp: 20   SpO2: 99%   Weight: 64.4 kg (142 lb)     BMI:                    23.63 kg/m^2

## 2023-10-05 NOTE — PROGRESS NOTES
"Follow-up [facility] consultation note:  Dejuan Zhu MD    Reason for consultation:     1. Pregnancy at 30 weeks and 4 days' gestation  2. Gestational diabetes requiring insulin  3. Patient required hospitalization for glycemic control   4. Polyhydramnios     Dear Forrest    Today, I had the opportunity to see your patient in follow-up at the [facility].  I greatly appreciate your request for follow-up imaging and consultation.  Your patient is a 32-year-old  4 para 3004 with a due date of December 10, 2023.  The patient was hospitalized for glycemic control in September.  A picture was curious.  The patient had no history of diabetes.  Her glucose levels were very high.  She required significant insulin.  Her build is slight.  Therefore, I favor late onset type 1 diabetes.  Her exact diagnosis will be easier to determine postpartum and the patient definitely needs an endocrine workup 6-12 weeks post delivery.    Physical examination    General:  Is a well-developed well-nourished female in no apparent distress  Vital signs:  Blood pressure 100/52, pulse 85, weight 142 lb, height 5'7", pulse oximetry 99%  HEENT:  Grossly within normal limits.  There is no facial edema.  The sclera appears normal.  Abdomen:  Benign exam.  The uterus is nontender to palpation.  The uterus is appropriate size.  Extremities:  No ankle edema is palpable.  Skin:  There is no rash or lesions.  Neuro:  Grossly intact  Psych:  The patient is appropriate for both mood and affect.      Imaging:  [facility] imaging was repeated today.  A full ultrasound report has been created in the [employer] system and a copy will be placed in the EMR and will also be forwarded to you separately.  In summary, today's imaging is similar to that 2 weeks ago in the hospital.  Overall growth is normal at the 69th percentile.  However, the abdominal circumference is 3 weeks ahead.  There is still significant polyhydramnios with " a TAMMY of 34 cm.  Importantly, the biophysical profile score of 8/8 was obtained.  This would predict a low probability of fetal jeopardy.    Counseling:  The patient is currently on 48 units of NPH and 30 units of regular with breakfast.  She takes 28 units of regular at dinner.  She takes 26 units of NPH at bedtime.  Review of her glucose log book does show adequate control.  Her only issue is that she does have some values the drop just below 60 at around 2:00 a.m..  If it remains at this level it is safe.  However, if her 2:00 a.m. glucose drops less than 50 then her regular at dinner should be decreased.  An alternative would be to switch her to Humalog which is short-acting then regular.  The patient expressed understanding.  She was advised that I would recommend a biophysical profile in your office over the next 2 weeks.  I will see her back here at the [facility] on the 23rd of October.    Impression:  Diabetes which is well controlled on her current insulin regimen.  Reassuring fetal ultrasound assessment.  Persistent polyhydramnios.    Recommendations:    1. Do the polyhydramnios I think we should plan on delivery at 37 weeks.  If her glycemic control worsened then delivery sooner may be required but I think the probability of this is low.    2. With your permission I will see the patient back here on October 23rd.  If problems arise between now and then please feel free to call me on my cell phone.    Please feel free to call me if you have any questions about the care of your patient.  The [facility] group can be reached 24 hours a day at [phone].  I greatly appreciate the opportunity to participate in the care of your patient.    Sincerely, Dejuan Zhu MD

## 2023-10-06 ENCOUNTER — ROUTINE PRENATAL (OUTPATIENT)
Dept: OBSTETRICS AND GYNECOLOGY | Facility: CLINIC | Age: 33
End: 2023-10-06
Payer: MEDICAID

## 2023-10-06 VITALS
BODY MASS INDEX: 24.63 KG/M2 | WEIGHT: 148 LBS | HEART RATE: 84 BPM | DIASTOLIC BLOOD PRESSURE: 66 MMHG | SYSTOLIC BLOOD PRESSURE: 101 MMHG

## 2023-10-06 DIAGNOSIS — Z3A.30 30 WEEKS GESTATION OF PREGNANCY: ICD-10-CM

## 2023-10-06 DIAGNOSIS — O24.414 INSULIN CONTROLLED GESTATIONAL DIABETES MELLITUS (GDM) IN THIRD TRIMESTER: ICD-10-CM

## 2023-10-06 DIAGNOSIS — O09.93 SUPERVISION OF HIGH RISK PREGNANCY IN THIRD TRIMESTER: Primary | ICD-10-CM

## 2023-10-06 LAB
ABS NRBC COUNT: 0 X 10 3/UL (ref 0–0.01)
ABSOLUTE BASOPHIL: 0.02 X 10 3/UL (ref 0–0.22)
ABSOLUTE EOSINOPHIL: 0.01 X 10 3/UL (ref 0.04–0.54)
ABSOLUTE IMMATURE GRAN: 0.16 X 10 3/UL (ref 0–0.04)
ABSOLUTE LYMPHOCYTE: 1.72 X 10 3/UL (ref 0.86–4.75)
ABSOLUTE MONOCYTE: 0.63 X 10 3/UL (ref 0.22–1.08)
BASOPHILS NFR BLD: 0.2 % (ref 0.2–1.2)
EOSINOPHIL NFR BLD: 0.1 % (ref 0.7–7)
HCT VFR BLD AUTO: 30.9 % (ref 37–47)
HGB BLD-MCNC: 9.9 G/DL (ref 12–16)
HIV 1+2 AB+HIV1 P24 AG SERPL QL IA: NONREACTIVE
IMMATURE GRANULOCYTES: 1.8 % (ref 0–0.5)
LYMPHOCYTES NFR BLD: 19.7 % (ref 19.3–53.1)
MCH RBC QN AUTO: 29 PG (ref 27–32)
MCHC RBC AUTO-ENTMCNC: 32 G/DL (ref 32–36)
MCV RBC AUTO: 90.6 FL (ref 82–100)
MONOCYTES NFR BLD: 7.2 % (ref 4.7–12.5)
NEUTROPHILS # BLD AUTO: 6.18 X 10 3/UL (ref 2.15–7.56)
NEUTROPHILS NFR BLD: 71 % (ref 34–71.1)
NUCLEATED RED BLOOD CELLS: 0 /100 WBC (ref 0–0.2)
PLATELET # BLD AUTO: 212 X 10 3/UL (ref 135–400)
RBC # BLD AUTO: 3.41 X 10 6/UL (ref 4.2–5.4)
RDW-SD: 47.7 FL (ref 37–54)
SYPHILIS TREPONEMAL ANTIBODY: NONREACTIVE
WBC # BLD: 8.72 X 10 3/UL (ref 4.3–10.8)

## 2023-10-06 PROCEDURE — 90471 IMMUNIZATION ADMIN: CPT | Mod: S$GLB,,, | Performed by: STUDENT IN AN ORGANIZED HEALTH CARE EDUCATION/TRAINING PROGRAM

## 2023-10-06 PROCEDURE — 90715 TDAP VACCINE GREATER THAN OR EQUAL TO 7YO IM: ICD-10-PCS | Mod: S$GLB,,, | Performed by: STUDENT IN AN ORGANIZED HEALTH CARE EDUCATION/TRAINING PROGRAM

## 2023-10-06 PROCEDURE — 99213 PR OFFICE/OUTPT VISIT, EST, LEVL III, 20-29 MIN: ICD-10-PCS | Mod: TH,25,S$GLB, | Performed by: STUDENT IN AN ORGANIZED HEALTH CARE EDUCATION/TRAINING PROGRAM

## 2023-10-06 PROCEDURE — 99213 OFFICE O/P EST LOW 20 MIN: CPT | Mod: TH,25,S$GLB, | Performed by: STUDENT IN AN ORGANIZED HEALTH CARE EDUCATION/TRAINING PROGRAM

## 2023-10-06 PROCEDURE — 90471 TDAP VACCINE GREATER THAN OR EQUAL TO 7YO IM: ICD-10-PCS | Mod: S$GLB,,, | Performed by: STUDENT IN AN ORGANIZED HEALTH CARE EDUCATION/TRAINING PROGRAM

## 2023-10-06 PROCEDURE — 90715 TDAP VACCINE 7 YRS/> IM: CPT | Mod: S$GLB,,, | Performed by: STUDENT IN AN ORGANIZED HEALTH CARE EDUCATION/TRAINING PROGRAM

## 2023-10-06 RX ORDER — LANCETS 33 GAUGE
EACH MISCELLANEOUS
COMMUNITY
Start: 2023-09-19 | End: 2023-11-03

## 2023-10-06 RX ORDER — LANCETS 30 GAUGE
EACH MISCELLANEOUS
COMMUNITY
Start: 2023-09-19

## 2023-10-06 RX ORDER — BLOOD SUGAR DIAGNOSTIC
STRIP MISCELLANEOUS
COMMUNITY
Start: 2023-09-19 | End: 2023-12-11

## 2023-10-06 NOTE — PROGRESS NOTES
CC: Follow-Up OB    HPI:   32 y.o.  at 30w5d with EDC of 12/10/2023, by Ultrasound, here for her follow up OB visit. Complains of nothing today. Glucose better controlled with new insulin regimen    Pregnancy ROS:  Positive fetal movement   Negative leakage of fluid   Negative vaginal bleeding   Negative headache, vision changes, RUQ pain, epigastric pain  Negative contractions/abdominal pain   Negative pelvic pressure     Physical Exam:  Prenatal Vitals  BP: 101/66  Weight: 67.1 kg (148 lb)  Urine Glucose: Negative  Urine Albumin: Negative    Wt Readings from Last 3 Encounters:   10/06/23 67.1 kg (148 lb)   10/05/23 64.4 kg (142 lb)   23 66.7 kg (147 lb)       Body mass index is 24.63 kg/m².    General: NAD, well developed, well nourished  Psych: alert and oriented to person, time and place, normal affect  HEENT: normocephalic, atraumatic  Abd: Gravid, soft, NT, ND  Skin: warm, dry  Neuro: normal gait, gross motor function intact  No cyanosis, clubbing or edema    FHTs: +    Maternal Blood Type: B+/-    ASSESSMENT: 32 y.o.  @ 30w5d with   Patient Active Problem List   Diagnosis    Encounter for supervision of normal pregnancy in first trimester    H/O:     Abnormal O'Sawyer glucose challenge test, antepartum    Insulin controlled gestational diabetes mellitus (GDM) in third trimester       PLAN:  Continue QID glucose checks, ADA diet  MFM note reviewed  Continue current insulin regimen  3rd trimester labs and tdap today  Pain, fever, bleeding precautions  AMADOU, ANITHA, PreE precautions  Cont PNV, Iron  Return to clinic in 2 weeks

## 2023-10-10 ENCOUNTER — PATIENT MESSAGE (OUTPATIENT)
Dept: OBSTETRICS AND GYNECOLOGY | Facility: CLINIC | Age: 33
End: 2023-10-10
Payer: MEDICAID

## 2023-10-10 RX ORDER — INSULIN HUMAN 100 [IU]/ML
INJECTION, SUSPENSION SUBCUTANEOUS
Qty: 10 ML | Refills: 6 | Status: SHIPPED | OUTPATIENT
Start: 2023-10-10

## 2023-10-15 ENCOUNTER — PATIENT MESSAGE (OUTPATIENT)
Dept: OTHER | Facility: OTHER | Age: 33
End: 2023-10-15
Payer: MEDICAID

## 2023-10-17 DIAGNOSIS — O24.414 INSULIN CONTROLLED GESTATIONAL DIABETES MELLITUS (GDM) IN THIRD TRIMESTER: Primary | ICD-10-CM

## 2023-10-23 ENCOUNTER — ROUTINE PRENATAL (OUTPATIENT)
Dept: OBSTETRICS AND GYNECOLOGY | Facility: CLINIC | Age: 33
End: 2023-10-23
Payer: MEDICAID

## 2023-10-23 ENCOUNTER — PROCEDURE VISIT (OUTPATIENT)
Dept: MATERNAL FETAL MEDICINE | Facility: CLINIC | Age: 33
End: 2023-10-23
Payer: MEDICAID

## 2023-10-23 VITALS
DIASTOLIC BLOOD PRESSURE: 67 MMHG | WEIGHT: 145 LBS | RESPIRATION RATE: 18 BRPM | DIASTOLIC BLOOD PRESSURE: 52 MMHG | SYSTOLIC BLOOD PRESSURE: 106 MMHG | BODY MASS INDEX: 24.13 KG/M2 | OXYGEN SATURATION: 98 % | BODY MASS INDEX: 24.13 KG/M2 | HEART RATE: 85 BPM | WEIGHT: 145 LBS | SYSTOLIC BLOOD PRESSURE: 96 MMHG | HEART RATE: 93 BPM

## 2023-10-23 DIAGNOSIS — O24.414 INSULIN CONTROLLED GESTATIONAL DIABETES MELLITUS (GDM) IN THIRD TRIMESTER: ICD-10-CM

## 2023-10-23 DIAGNOSIS — O09.93 SUPERVISION OF HIGH RISK PREGNANCY IN THIRD TRIMESTER: Primary | ICD-10-CM

## 2023-10-23 DIAGNOSIS — Z3A.33 33 WEEKS GESTATION OF PREGNANCY: ICD-10-CM

## 2023-10-23 PROCEDURE — 99213 PR OFFICE/OUTPT VISIT, EST, LEVL III, 20-29 MIN: ICD-10-PCS | Mod: TH,S$GLB,, | Performed by: OBSTETRICS & GYNECOLOGY

## 2023-10-23 PROCEDURE — 99213 OFFICE O/P EST LOW 20 MIN: CPT | Mod: TH,S$GLB,, | Performed by: OBSTETRICS & GYNECOLOGY

## 2023-10-23 PROCEDURE — 76816 PR  US,PREGNANT UTERUS,F/U,TRANSABD APP: ICD-10-PCS | Mod: S$GLB,,, | Performed by: OBSTETRICS & GYNECOLOGY

## 2023-10-23 PROCEDURE — 76819 FETAL BIOPHYS PROFIL W/O NST: CPT | Mod: S$GLB,,, | Performed by: OBSTETRICS & GYNECOLOGY

## 2023-10-23 PROCEDURE — 76816 OB US FOLLOW-UP PER FETUS: CPT | Mod: S$GLB,,, | Performed by: OBSTETRICS & GYNECOLOGY

## 2023-10-23 PROCEDURE — 99213 OFFICE O/P EST LOW 20 MIN: CPT | Mod: TH,S$GLB,, | Performed by: STUDENT IN AN ORGANIZED HEALTH CARE EDUCATION/TRAINING PROGRAM

## 2023-10-23 PROCEDURE — 99213 PR OFFICE/OUTPT VISIT, EST, LEVL III, 20-29 MIN: ICD-10-PCS | Mod: TH,S$GLB,, | Performed by: STUDENT IN AN ORGANIZED HEALTH CARE EDUCATION/TRAINING PROGRAM

## 2023-10-23 PROCEDURE — 76819 PR US, OB, FETAL BIOPHYSICAL, W/O NST: ICD-10-PCS | Mod: S$GLB,,, | Performed by: OBSTETRICS & GYNECOLOGY

## 2023-10-23 NOTE — PROGRESS NOTES
Follow-up [facility] consultation note:  Dejuan Zhu MD    Reason for consultation:     1. Pregnancy at 34 weeks' gestation  2. Gestational diabetes requiring insulin  3. History of Polyhydramnios    Dear Forrest  Today, I had the opportunity to see your patient in follow-up at the [facility].  I greatly appreciate your request for follow-up imaging and consultation.  Your patient is a 33-year-old  4 para 3004 with a due date of December 10, 2023.  The patient did not bring her log book today.  She states she is been report them to you weekly.  Currently she is taking 40 units of NPH and 28 units of regular with breakfast.  She takes 28 units of regular with dinner.  She takes 26 units of NPH at bedtime.  Her glycemic control is much improved from when I saw her 3 weeks ago.  Today she denies signs and symptoms of preeclampsia.    Physical examination    General:  This is a well-developed well-nourished female in no apparent distress.  Vital signs: Blood pressure 100/52, pulse 85, weight 142 lb, pulse oximetry 99%  HEENT:  Grossly within normal limits.  There is no facial edema.  The sclera appears normal.  Abdomen:  Benign exam.  The uterus is nontender to palpation.  The uterus is appropriate size.  Extremities:  No ankle edema is palpable.  Skin:  There is no rash or lesions.  Neuro:  Grossly intact  Psych:  The patient is appropriate for both mood and affect.      Imaging:  [facility] imaging was repeated today.  A full ultrasound report has been created in the [employer] system and a copy will be placed in the EMR and will also be forwarded to you separately.  In summary, the clinical picture is improved.  The polyhydramnios has resolved.  The fetus is normally grown.  Biophysical profile testing was reassuring.    Counseling:  The patient was counseled that the clinical picture is improved.  Her control is quite good.  There is no evidence of polyhydramnios.  There is no evidence of fetal  compromise.  The patient has no complaints of somatic issues of pregnancy.  She does not complain of musculoskeletal pain and this is all very reassuring.  The patient is aware that previously I recommend delivery at 37 weeks.  She was informed this was mostly because of polyhydramnios and also appeared very poor glycemic control.  However, things look better and I am not convinced that 37 week deliveries required.  She expressed understanding.    Impression:  Reassuring assessment of both fetus and mother today.  There is no evidence of preeclampsia.  The patient's diabetes appears to be adequately controlled.  The polyhydramnios has resolved.    Recommendations:    1. We will see the patient back again in 3 weeks.  A repeat assessment of both mother and baby will be provided.  At that time we will suggest the optimal timing of delivery for your patient.    2. The patient is encouraged to continue her diabetic diet and insulin as prescribed.    3. Please continue weekly fetal surveillance and also glucose reporting and insulin adjustments     Please feel free to call me if you have any questions about the care of your patient.  The [facility] MFM group can be reached 24 hours a day at [phone].  I greatly appreciate the opportunity to participate in the care of your patient.    Sincerely, Dejuan Zhu MD

## 2023-10-23 NOTE — PROGRESS NOTES
Katherine is here for followup M consultation for gestational diabetes (? Type 1) in pregnancy, referred by Dr. Del Valle.    She is feeling fetal movement.    Katherine denies vaginal bleeding, loss of fluid, recurrent contractions.    She did not bring her glucose log today, Dr Del Valle reviews her log every week.  She is taking medications for control,    Insulin:     40 units N and 28 units R before breakfast       28 units R before dinner       26 units N at HS    Vitals:    10/23/23 1021   BP: (!) 96/52   Pulse: 93   Resp: 18   SpO2: 98%   Weight: 65.8 kg (145 lb)     BMI:                    24.13 kg/m^2

## 2023-10-23 NOTE — PROGRESS NOTES
CC: Follow-Up OB    HPI:   33 y.o.  at 33w1d with EDC of 12/10/2023, by Ultrasound, here for her follow up OB visit. Complains of nothing today. Reports feeling better.    Pregnancy ROS:  Positive fetal movement   Negative leakage of fluid   Negative vaginal bleeding   Negative headache, vision changes, RUQ pain, epigastric pain  Negative contractions/abdominal pain   Negative pelvic pressure     Physical Exam:  Prenatal Vitals  BP: 106/67  Weight: 65.8 kg (145 lb)    Wt Readings from Last 3 Encounters:   10/23/23 65.8 kg (145 lb)   10/23/23 65.8 kg (145 lb)   10/06/23 67.1 kg (148 lb)       Body mass index is 24.13 kg/m².    General: NAD, well developed, well nourished  Psych: alert and oriented to person, time and place, normal affect  HEENT: normocephalic, atraumatic  Abd: Gravid, soft, NT, ND  Skin: warm, dry  Neuro: normal gait, gross motor function intact  No cyanosis, clubbing or edema    FHTs: +    Maternal Blood Type: B+/-    ASSESSMENT: 33 y.o.  @ 33w1d with   Patient Active Problem List   Diagnosis    Encounter for supervision of normal pregnancy in first trimester    H/O:     Abnormal O'Sawyer glucose challenge test, antepartum    Insulin controlled gestational diabetes mellitus (GDM) in third trimester       PLAN:  Continue QID glucose checks, ADA diet  MFM note reviewed  Continue current insulin regimen  3rd trimester labs reviewed  Pain, fever, bleeding precautions  AMADOU, ANITHA, PreE precautions  Cont PNV, Iron  Return to clinic in 1 weeks       Pt c/o vag bleeding, onset 45 mins PTA. States she had 3 fully saturated pads since. Pt c/o low abd pain, denies problems with bowel and bladder.  Pt denies n/v, denies dizziness, lightheadedness, denies MIESHA

## 2023-11-03 RX ORDER — LANCETS 33 GAUGE
EACH MISCELLANEOUS
Qty: 200 EACH | Refills: 4 | Status: SHIPPED | OUTPATIENT
Start: 2023-11-03

## 2023-11-05 ENCOUNTER — PATIENT MESSAGE (OUTPATIENT)
Dept: OTHER | Facility: OTHER | Age: 33
End: 2023-11-05
Payer: MEDICAID

## 2023-11-07 ENCOUNTER — ROUTINE PRENATAL (OUTPATIENT)
Dept: OBSTETRICS AND GYNECOLOGY | Facility: CLINIC | Age: 33
End: 2023-11-07
Payer: MEDICAID

## 2023-11-07 ENCOUNTER — PROCEDURE VISIT (OUTPATIENT)
Dept: OBSTETRICS AND GYNECOLOGY | Facility: CLINIC | Age: 33
End: 2023-11-07
Payer: MEDICAID

## 2023-11-07 VITALS
DIASTOLIC BLOOD PRESSURE: 55 MMHG | HEART RATE: 85 BPM | SYSTOLIC BLOOD PRESSURE: 92 MMHG | BODY MASS INDEX: 24.63 KG/M2 | WEIGHT: 148 LBS

## 2023-11-07 DIAGNOSIS — Z3A.35 35 WEEKS GESTATION OF PREGNANCY: ICD-10-CM

## 2023-11-07 DIAGNOSIS — O24.414 INSULIN CONTROLLED GESTATIONAL DIABETES MELLITUS (GDM) IN THIRD TRIMESTER: ICD-10-CM

## 2023-11-07 DIAGNOSIS — O09.93 SUPERVISION OF HIGH RISK PREGNANCY IN THIRD TRIMESTER: Primary | ICD-10-CM

## 2023-11-07 DIAGNOSIS — O24.414 INSULIN CONTROLLED GESTATIONAL DIABETES MELLITUS (GDM) IN THIRD TRIMESTER: Primary | ICD-10-CM

## 2023-11-07 PROCEDURE — 76819 FETAL BIOPHYS PROFIL W/O NST: CPT | Mod: S$GLB,,, | Performed by: STUDENT IN AN ORGANIZED HEALTH CARE EDUCATION/TRAINING PROGRAM

## 2023-11-07 PROCEDURE — 76819 US OB/GYN PROCEDURE (VIEWPOINT): ICD-10-PCS | Mod: S$GLB,,, | Performed by: STUDENT IN AN ORGANIZED HEALTH CARE EDUCATION/TRAINING PROGRAM

## 2023-11-07 PROCEDURE — 99213 OFFICE O/P EST LOW 20 MIN: CPT | Mod: TH,S$GLB,, | Performed by: STUDENT IN AN ORGANIZED HEALTH CARE EDUCATION/TRAINING PROGRAM

## 2023-11-07 PROCEDURE — 99213 PR OFFICE/OUTPT VISIT, EST, LEVL III, 20-29 MIN: ICD-10-PCS | Mod: TH,S$GLB,, | Performed by: STUDENT IN AN ORGANIZED HEALTH CARE EDUCATION/TRAINING PROGRAM

## 2023-11-07 NOTE — PROGRESS NOTES
CC: Follow-Up OB    HPI:   33 y.o.  at 35w2d with EDC of 12/10/2023, by Ultrasound, here for her follow up OB visit. Complains of nothing today, glucose log reviewed, all values wnl.    Pregnancy ROS:  Positive fetal movement   Negative leakage of fluid   Negative vaginal bleeding   Negative headache, vision changes, RUQ pain, epigastric pain  Negative contractions/abdominal pain   Negative pelvic pressure     Physical Exam:  Prenatal Vitals  BP: (!) 92/55  Weight: 67.1 kg (148 lb)    Wt Readings from Last 3 Encounters:   23 67.1 kg (148 lb)   10/23/23 65.8 kg (145 lb)   10/23/23 65.8 kg (145 lb)     Body mass index is 24.63 kg/m².    General: NAD, well developed, well nourished  Psych: alert and oriented to person, time and place, normal affect  HEENT: normocephalic, atraumatic  Abd: Gravid, soft, NT, ND  Skin: warm, dry  Neuro: normal gait, gross motor function intact  No cyanosis, clubbing or edema    FHTs: will get on US    Maternal Blood Type: B+/-    ASSESSMENT: 33 y.o.  @ 35w2d with   Patient Active Problem List   Diagnosis    Encounter for supervision of normal pregnancy in first trimester    H/O:     Abnormal O'Sawyer glucose challenge test, antepartum    Insulin controlled gestational diabetes mellitus (GDM) in third trimester       PLAN:  Continue QID glucose checks, ADA diet  Continue current insulin regimen  Rpt C/s schedule on RTC  BPP today  Pain, fever, bleeding precautions  AMADOU, ANITHA, PreE precautions  Cont PNV, Iron  Return to clinic in 1 weeks

## 2023-11-16 ENCOUNTER — CLINICAL SUPPORT (OUTPATIENT)
Dept: MATERNAL FETAL MEDICINE | Facility: CLINIC | Age: 33
End: 2023-11-16
Payer: MEDICAID

## 2023-11-16 ENCOUNTER — PATIENT MESSAGE (OUTPATIENT)
Dept: OBSTETRICS AND GYNECOLOGY | Facility: CLINIC | Age: 33
End: 2023-11-16

## 2023-11-16 ENCOUNTER — ROUTINE PRENATAL (OUTPATIENT)
Dept: OBSTETRICS AND GYNECOLOGY | Facility: CLINIC | Age: 33
End: 2023-11-16
Payer: MEDICAID

## 2023-11-16 VITALS — BODY MASS INDEX: 24.79 KG/M2 | DIASTOLIC BLOOD PRESSURE: 61 MMHG | SYSTOLIC BLOOD PRESSURE: 97 MMHG | WEIGHT: 149 LBS

## 2023-11-16 VITALS
HEART RATE: 54 BPM | DIASTOLIC BLOOD PRESSURE: 50 MMHG | SYSTOLIC BLOOD PRESSURE: 92 MMHG | BODY MASS INDEX: 24.79 KG/M2 | WEIGHT: 149 LBS | OXYGEN SATURATION: 100 % | RESPIRATION RATE: 18 BRPM

## 2023-11-16 DIAGNOSIS — O09.93 SUPERVISION OF HIGH RISK PREGNANCY IN THIRD TRIMESTER: Primary | ICD-10-CM

## 2023-11-16 DIAGNOSIS — O24.414 INSULIN CONTROLLED GESTATIONAL DIABETES MELLITUS (GDM) IN THIRD TRIMESTER: ICD-10-CM

## 2023-11-16 DIAGNOSIS — Z3A.36 36 WEEKS GESTATION OF PREGNANCY: ICD-10-CM

## 2023-11-16 PROCEDURE — 99213 OFFICE O/P EST LOW 20 MIN: CPT | Mod: 25,TH,95, | Performed by: OBSTETRICS & GYNECOLOGY

## 2023-11-16 PROCEDURE — 76816 OB US FOLLOW-UP PER FETUS: CPT | Mod: NDTC,,, | Performed by: OBSTETRICS & GYNECOLOGY

## 2023-11-16 PROCEDURE — 99213 PR OFFICE/OUTPT VISIT, EST, LEVL III, 20-29 MIN: ICD-10-PCS | Mod: TH,S$GLB,, | Performed by: STUDENT IN AN ORGANIZED HEALTH CARE EDUCATION/TRAINING PROGRAM

## 2023-11-16 PROCEDURE — 76816 PR  US,PREGNANT UTERUS,F/U,TRANSABD APP: ICD-10-PCS | Mod: NDTC,,, | Performed by: OBSTETRICS & GYNECOLOGY

## 2023-11-16 PROCEDURE — 76819 FETAL BIOPHYS PROFIL W/O NST: CPT | Mod: NDTC,,, | Performed by: OBSTETRICS & GYNECOLOGY

## 2023-11-16 PROCEDURE — 76819 PR US, OB, FETAL BIOPHYSICAL, W/O NST: ICD-10-PCS | Mod: NDTC,,, | Performed by: OBSTETRICS & GYNECOLOGY

## 2023-11-16 PROCEDURE — 99213 PR OFFICE/OUTPT VISIT, EST, LEVL III, 20-29 MIN: ICD-10-PCS | Mod: 25,TH,95, | Performed by: OBSTETRICS & GYNECOLOGY

## 2023-11-16 PROCEDURE — 99213 OFFICE O/P EST LOW 20 MIN: CPT | Mod: TH,S$GLB,, | Performed by: STUDENT IN AN ORGANIZED HEALTH CARE EDUCATION/TRAINING PROGRAM

## 2023-11-16 NOTE — PROGRESS NOTES
Follow-up [facility] consultation note via Telemedicine:  Michelle Guajardo DO  Reason for consultation:     1. Pregnancy at 36 weeks and 4 days  2. Gestational diabetes requiring insulin  3. Previous polyhydramnios now resolved    Dear Dr. Del Valle  Today, I had the opportunity to see your patient in follow-up at the [facility] via Telemedicine.  She was located there in [facility] and I was present in our [place] office..  I greatly appreciate your request for follow-up imaging and consultation.  Your patient is a 33-year-old  4 para 3004 with a due date of December 10, 2023.  The patient did not bring her log book today however states that her blood sugars are much improved with fastings in the 70-80s and her postprandials are usually 90-100s.  She is currently on 40 units N and 28 units R before breakfast, 28 units R before dinner and26 units N at HS.  She states she is been report them to you weekly. With her previous visit with Dr. Zhu the polyhydramnios was improved and he did not feel that a 37 week delivery would necessarily be indicated.    Today she denies signs and symptoms of preeclampsia.    Physical examination  Vitals:    23 0835   BP: (!) 92/50   Pulse: (!) 54   Resp: 18   Physical exam:  Gen: WDWN in NAD  HEENT: WNL  Abdomen: gravid  Skin: No rash or jaundice  Extremities: Symmetric in size, no clubbing, cyanosis, or edema  Neuro: Grossly intact  Exam via Telemedicine        Imaging:  Single intrauterine pregnancy measuring 37 weeks and 6 days with an estimated fetal weight of [id number].  This is consistent with a previously determined HOWARD and is at the 75th percentile.  The fetus is in the vertex presentation.  The placenta is posterior without evidence of previa.  The amniotic fluid is normal with an TAMMY of 8.6cm.  Completion of the anatomical survey was performed and there are no structural anomalies or aneuploidy on ultrasound today.  Additionally, a BPP was performed  that was .      Counseling:  Today I discussed with your patient that her clinical picture remains stable and improved from previously.  I agree with Dr. Jean assessment that a 37 weeks delivery is not currently indicated.  I informed her my recommendations is for a repeat  Delivery between 39 0/7 weeks and 39 6/7 weeks.        Recommendations:    1. Patient was instructed to keep all of her upcoming appointments with you.  At this point in time no further follow up is recommended in the [facility] office.     2. She was instructed to continue to send in her blood sugars to your office on a weekly basis.  I recommend weekly  testing with BPP or twice weekly with NST until delivery and delivery recommendations are between 39 0/7 weeks and 39 6/7 weeks.      3. She is also at an increased lifetime risk of Diabetes Mellitus Type 2 and therefore recommend a 2hr GTT to be performed 4-12 weeks postpartum.      Please feel free to call me if you have any questions about the care of your patient.  The [facility] group can be reached 24 hours a day at [phone].  I greatly appreciate the opportunity to participate in the care of your patient.    Sincerely,     Michelle Guajardo     Telemedicine Consultation today was performed with the patient located at [facility] in [place], [place] and I was located at the Maternal Fetal Medicine office at [facility] in [place], [place].

## 2023-11-16 NOTE — PROGRESS NOTES
CC: Follow-Up OB    HPI:   33 y.o.  at 36w4d with EDC of 12/10/2023, by Ultrasound, here for her follow up OB visit. Complains of nothing today.    Pregnancy ROS:  Positive fetal movement   Negative leakage of fluid   Negative vaginal bleeding   Negative headache, vision changes, RUQ pain, epigastric pain  Negative contractions/abdominal pain   Negative pelvic pressure     Physical Exam:  Prenatal Vitals  BP: 97/61  Weight: 67.6 kg (149 lb)    Wt Readings from Last 3 Encounters:   23 67.6 kg (149 lb)   23 67.6 kg (149 lb)   23 67.1 kg (148 lb)       Body mass index is 24.79 kg/m².    General: NAD, well developed, well nourished  Psych: alert and oriented to person, time and place, normal affect  HEENT: normocephalic, atraumatic  Abd: Gravid, soft, NT, ND  Skin: warm, dry  Neuro: normal gait, gross motor function intact  No cyanosis, clubbing or edema    FHTs: +        ASSESSMENT: 33 y.o.  @ 36w4d with   Patient Active Problem List   Diagnosis    Encounter for supervision of normal pregnancy in first trimester    H/O:     Abnormal O'Sawyer glucose challenge test, antepartum    Insulin controlled gestational diabetes mellitus (GDM) in third trimester       PLAN:  Pt scheduled for rpt C/s on   Continue QID glucose checks, ADA diet  Continue current insulin regimen  Continue weekly BPPs  Pain, fever, bleeding precautions  AMADOU, ANITHA, PreE precautions  Cont PNV, Iron  Return to clinic in 1 weeks

## 2023-11-16 NOTE — PROGRESS NOTES
Katherine is here for followup MFM consultation for Type 1 diabetes in pregnancy, and resolved polyhydramnios at last visit referred by Dr. Del Valle.    She is feeling fetal movement.    Katherine denies vaginal bleeding, loss of fluid, but states is starting to feel occasional contractions.    She did not bring her glucose log today but states that Dr. Del Valle reviews her sugars weekly.  She is taking medications for control,    Insulin:     40 units N and 28 units R before breakfast       28 units R before dinner       26 units N at HS    Vitals:    11/16/23 0835   BP: (!) 92/50   Pulse: (!) 54   Resp: 18   SpO2: 100%   Weight: 67.6 kg (149 lb)     BMI:                    24.79 kg/m^2

## 2023-11-20 ENCOUNTER — ROUTINE PRENATAL (OUTPATIENT)
Dept: OBSTETRICS AND GYNECOLOGY | Facility: CLINIC | Age: 33
End: 2023-11-20
Payer: MEDICAID

## 2023-11-20 ENCOUNTER — PROCEDURE VISIT (OUTPATIENT)
Dept: OBSTETRICS AND GYNECOLOGY | Facility: CLINIC | Age: 33
End: 2023-11-20
Payer: MEDICAID

## 2023-11-20 VITALS
DIASTOLIC BLOOD PRESSURE: 63 MMHG | HEART RATE: 99 BPM | WEIGHT: 153 LBS | SYSTOLIC BLOOD PRESSURE: 112 MMHG | BODY MASS INDEX: 25.46 KG/M2

## 2023-11-20 DIAGNOSIS — Z3A.37 37 WEEKS GESTATION OF PREGNANCY: ICD-10-CM

## 2023-11-20 DIAGNOSIS — O09.93 SUPERVISION OF HIGH RISK PREGNANCY IN THIRD TRIMESTER: ICD-10-CM

## 2023-11-20 DIAGNOSIS — O24.414 INSULIN CONTROLLED GESTATIONAL DIABETES MELLITUS (GDM) IN THIRD TRIMESTER: ICD-10-CM

## 2023-11-20 DIAGNOSIS — O09.93 SUPERVISION OF HIGH RISK PREGNANCY IN THIRD TRIMESTER: Primary | ICD-10-CM

## 2023-11-20 PROCEDURE — 76819 FETAL BIOPHYS PROFIL W/O NST: CPT | Mod: S$GLB,,, | Performed by: STUDENT IN AN ORGANIZED HEALTH CARE EDUCATION/TRAINING PROGRAM

## 2023-11-20 PROCEDURE — 99213 OFFICE O/P EST LOW 20 MIN: CPT | Mod: TH,S$GLB,, | Performed by: STUDENT IN AN ORGANIZED HEALTH CARE EDUCATION/TRAINING PROGRAM

## 2023-11-20 PROCEDURE — 99213 PR OFFICE/OUTPT VISIT, EST, LEVL III, 20-29 MIN: ICD-10-PCS | Mod: TH,S$GLB,, | Performed by: STUDENT IN AN ORGANIZED HEALTH CARE EDUCATION/TRAINING PROGRAM

## 2023-11-20 PROCEDURE — 76819 US OB/GYN PROCEDURE (VIEWPOINT): ICD-10-PCS | Mod: S$GLB,,, | Performed by: STUDENT IN AN ORGANIZED HEALTH CARE EDUCATION/TRAINING PROGRAM

## 2023-11-20 NOTE — PROGRESS NOTES
CC: Follow-Up OB    HPI:   33 y.o.  at 37w1d with EDC of 12/10/2023, by Ultrasound, here for her follow up OB visit. Complains of nothing today.    Pregnancy ROS:  Positive fetal movement   Negative leakage of fluid   Negative vaginal bleeding   Negative headache, vision changes, RUQ pain, epigastric pain  Negative contractions/abdominal pain   Negative pelvic pressure     Physical Exam:  Prenatal Vitals  BP: 112/63  Weight: 69.4 kg (153 lb)    Wt Readings from Last 3 Encounters:   23 69.4 kg (153 lb)   23 67.6 kg (149 lb)   23 67.6 kg (149 lb)     Body mass index is 25.46 kg/m².    General: NAD, well developed, well nourished  Psych: alert and oriented to person, time and place, normal affect  HEENT: normocephalic, atraumatic  Abd: Gravid, soft, NT, ND  Skin: warm, dry  Neuro: normal gait, gross motor function intact  No cyanosis, clubbing or edema    FHTs: will get on US    Maternal Blood Type: B+/-    ASSESSMENT: 33 y.o.  @ 37w1d with   Patient Active Problem List   Diagnosis    Encounter for supervision of normal pregnancy in first trimester    H/O:     Abnormal O'Sawyer glucose challenge test, antepartum    Insulin controlled gestational diabetes mellitus (GDM) in third trimester       PLAN:  Pt scheduled for rpt C/s on   Continue QID glucose checks, ADA diet  Continue current insulin regimen  Continue weekly BPPs  Pain, fever, bleeding precautions  AMADOU, ANITHA, PreE precautions  Cont PNV, Iron  Return to clinic in 1 weeks

## 2023-11-24 DIAGNOSIS — O09.93 SUPERVISION OF HIGH RISK PREGNANCY IN THIRD TRIMESTER: Primary | ICD-10-CM

## 2023-11-28 ENCOUNTER — ROUTINE PRENATAL (OUTPATIENT)
Dept: OBSTETRICS AND GYNECOLOGY | Facility: CLINIC | Age: 33
End: 2023-11-28
Payer: MEDICAID

## 2023-11-28 ENCOUNTER — PROCEDURE VISIT (OUTPATIENT)
Dept: OBSTETRICS AND GYNECOLOGY | Facility: CLINIC | Age: 33
End: 2023-11-28
Payer: MEDICAID

## 2023-11-28 VITALS
SYSTOLIC BLOOD PRESSURE: 115 MMHG | BODY MASS INDEX: 25.79 KG/M2 | DIASTOLIC BLOOD PRESSURE: 70 MMHG | HEART RATE: 98 BPM | WEIGHT: 155 LBS

## 2023-11-28 DIAGNOSIS — O09.93 SUPERVISION OF HIGH RISK PREGNANCY IN THIRD TRIMESTER: ICD-10-CM

## 2023-11-28 DIAGNOSIS — O24.414 INSULIN CONTROLLED GESTATIONAL DIABETES MELLITUS (GDM) IN THIRD TRIMESTER: ICD-10-CM

## 2023-11-28 DIAGNOSIS — O09.93 SUPERVISION OF HIGH RISK PREGNANCY IN THIRD TRIMESTER: Primary | ICD-10-CM

## 2023-11-28 DIAGNOSIS — Z3A.38 38 WEEKS GESTATION OF PREGNANCY: ICD-10-CM

## 2023-11-28 PROCEDURE — 76819 FETAL BIOPHYS PROFIL W/O NST: CPT | Mod: S$GLB,,, | Performed by: STUDENT IN AN ORGANIZED HEALTH CARE EDUCATION/TRAINING PROGRAM

## 2023-11-28 PROCEDURE — 99213 OFFICE O/P EST LOW 20 MIN: CPT | Mod: TH,S$GLB,, | Performed by: STUDENT IN AN ORGANIZED HEALTH CARE EDUCATION/TRAINING PROGRAM

## 2023-11-28 PROCEDURE — 99213 PR OFFICE/OUTPT VISIT, EST, LEVL III, 20-29 MIN: ICD-10-PCS | Mod: TH,S$GLB,, | Performed by: STUDENT IN AN ORGANIZED HEALTH CARE EDUCATION/TRAINING PROGRAM

## 2023-11-28 PROCEDURE — 76819 US OB/GYN PROCEDURE (VIEWPOINT): ICD-10-PCS | Mod: S$GLB,,, | Performed by: STUDENT IN AN ORGANIZED HEALTH CARE EDUCATION/TRAINING PROGRAM

## 2023-11-28 NOTE — PROGRESS NOTES
CC: Follow-Up OB    HPI:   33 y.o.  at 38w2d with EDC of 12/10/2023, by Ultrasound, here for her follow up OB visit. Complains of nothing today. Glucose controlled.    Pregnancy ROS:  Positive fetal movement   Negative leakage of fluid   Negative vaginal bleeding   Negative headache, vision changes, RUQ pain, epigastric pain  Negative contractions/abdominal pain   Negative pelvic pressure     Physical Exam:  Prenatal Vitals  BP: 115/70  Weight: 70.3 kg (155 lb)    Wt Readings from Last 3 Encounters:   23 70.3 kg (155 lb)   23 69.4 kg (153 lb)   23 67.6 kg (149 lb)     Body mass index is 25.79 kg/m².    General: NAD, well developed, well nourished  Psych: alert and oriented to person, time and place, normal affect  HEENT: normocephalic, atraumatic  Abd: Gravid, soft, NT, ND  Skin: warm, dry  Neuro: normal gait, gross motor function intact  No cyanosis, clubbing or edema    FHTs: +    Maternal Blood Type: B+/-    ASSESSMENT: 33 y.o.  @ 38w2d with   Patient Active Problem List   Diagnosis    Encounter for supervision of normal pregnancy in first trimester    H/O:     Abnormal O'Sawyer glucose challenge test, antepartum    Insulin controlled gestational diabetes mellitus (GDM) in third trimester     PLAN:  Pt scheduled for rpt C/s on   Continue QID glucose checks, ADA diet  Continue current insulin regimen  Continue weekly BPPs,  today  Pain, fever, bleeding precautions  AMADOU, ANITHA, PreE precautions  Cont PNV, Iron  Return to clinic in 1 weeks

## 2023-12-01 DIAGNOSIS — O09.93 SUPERVISION OF HIGH RISK PREGNANCY IN THIRD TRIMESTER: Primary | ICD-10-CM

## 2023-12-04 ENCOUNTER — PATIENT MESSAGE (OUTPATIENT)
Dept: OBSTETRICS AND GYNECOLOGY | Facility: CLINIC | Age: 33
End: 2023-12-04
Payer: MEDICAID

## 2023-12-05 ENCOUNTER — ROUTINE PRENATAL (OUTPATIENT)
Dept: OBSTETRICS AND GYNECOLOGY | Facility: CLINIC | Age: 33
End: 2023-12-05
Payer: MEDICAID

## 2023-12-05 ENCOUNTER — PROCEDURE VISIT (OUTPATIENT)
Dept: OBSTETRICS AND GYNECOLOGY | Facility: CLINIC | Age: 33
End: 2023-12-05
Payer: MEDICAID

## 2023-12-05 VITALS
BODY MASS INDEX: 25.79 KG/M2 | DIASTOLIC BLOOD PRESSURE: 71 MMHG | SYSTOLIC BLOOD PRESSURE: 110 MMHG | WEIGHT: 155 LBS | HEART RATE: 71 BPM

## 2023-12-05 DIAGNOSIS — O24.414 INSULIN CONTROLLED GESTATIONAL DIABETES MELLITUS (GDM) IN THIRD TRIMESTER: ICD-10-CM

## 2023-12-05 DIAGNOSIS — O09.93 SUPERVISION OF HIGH RISK PREGNANCY IN THIRD TRIMESTER: Primary | ICD-10-CM

## 2023-12-05 DIAGNOSIS — Z3A.39 39 WEEKS GESTATION OF PREGNANCY: ICD-10-CM

## 2023-12-05 DIAGNOSIS — O09.93 SUPERVISION OF HIGH RISK PREGNANCY IN THIRD TRIMESTER: ICD-10-CM

## 2023-12-05 PROCEDURE — 76819 US OB/GYN PROCEDURE (VIEWPOINT): ICD-10-PCS | Mod: S$GLB,,, | Performed by: STUDENT IN AN ORGANIZED HEALTH CARE EDUCATION/TRAINING PROGRAM

## 2023-12-05 PROCEDURE — 76816 US OB/GYN PROCEDURE (VIEWPOINT): ICD-10-PCS | Mod: S$GLB,,, | Performed by: STUDENT IN AN ORGANIZED HEALTH CARE EDUCATION/TRAINING PROGRAM

## 2023-12-05 PROCEDURE — 99213 OFFICE O/P EST LOW 20 MIN: CPT | Mod: TH,S$GLB,, | Performed by: STUDENT IN AN ORGANIZED HEALTH CARE EDUCATION/TRAINING PROGRAM

## 2023-12-05 PROCEDURE — 76819 FETAL BIOPHYS PROFIL W/O NST: CPT | Mod: S$GLB,,, | Performed by: STUDENT IN AN ORGANIZED HEALTH CARE EDUCATION/TRAINING PROGRAM

## 2023-12-05 PROCEDURE — 99213 PR OFFICE/OUTPT VISIT, EST, LEVL III, 20-29 MIN: ICD-10-PCS | Mod: TH,S$GLB,, | Performed by: STUDENT IN AN ORGANIZED HEALTH CARE EDUCATION/TRAINING PROGRAM

## 2023-12-05 PROCEDURE — 76816 OB US FOLLOW-UP PER FETUS: CPT | Mod: S$GLB,,, | Performed by: STUDENT IN AN ORGANIZED HEALTH CARE EDUCATION/TRAINING PROGRAM

## 2023-12-05 NOTE — PROGRESS NOTES
CC: Follow-Up OB    HPI:   33 y.o.  at 39w2d with EDC of 12/10/2023, by Ultrasound, here for her follow up OB visit. Complains of nothing today. Glucose well controlled.    Pregnancy ROS:  Positive fetal movement   Negative leakage of fluid   Negative vaginal bleeding   Negative headache, vision changes, RUQ pain, epigastric pain  Negative contractions/abdominal pain   Negative pelvic pressure     Physical Exam:  Prenatal Vitals  BP: 110/71  Weight: 70.3 kg (155 lb)    Wt Readings from Last 3 Encounters:   23 70.3 kg (155 lb)   23 70.3 kg (155 lb)   23 69.4 kg (153 lb)       Body mass index is 25.79 kg/m².    General: NAD, well developed, well nourished  Psych: alert and oriented to person, time and place, normal affect  HEENT: normocephalic, atraumatic  Abd: Gravid, soft, NT, ND  Skin: warm, dry  Neuro: normal gait, gross motor function intact  No cyanosis, clubbing or edema    FHTs: + on US    Maternal Blood Type: B+/-    ASSESSMENT: 33 y.o.  @ 39w2d with   Patient Active Problem List   Diagnosis    Encounter for supervision of normal pregnancy in first trimester    H/O:     Abnormal O'Sawyer glucose challenge test, antepartum    Insulin controlled gestational diabetes mellitus (GDM) in third trimester       PLAN:  Pt scheduled for rpt C/s on   Continue QID glucose checks, ADA diet  Continue current insulin regimen  Continue weekly BPPs, growth scan reviewed,  BPP today  Pain, fever, bleeding precautions  AMADOU, ANITHA, PreE precautions  Cont PNV, Iron  Return to clinic in 1 weeks

## 2023-12-06 ENCOUNTER — OUTSIDE PLACE OF SERVICE (OUTPATIENT)
Dept: OBSTETRICS AND GYNECOLOGY | Facility: CLINIC | Age: 33
End: 2023-12-06
Payer: MEDICAID

## 2023-12-06 ENCOUNTER — PATIENT MESSAGE (OUTPATIENT)
Dept: OBSTETRICS AND GYNECOLOGY | Facility: CLINIC | Age: 33
End: 2023-12-06

## 2023-12-06 PROCEDURE — 59514 PR CESAREAN DELIVERY ONLY: ICD-10-PCS | Mod: GB,,, | Performed by: STUDENT IN AN ORGANIZED HEALTH CARE EDUCATION/TRAINING PROGRAM

## 2023-12-06 PROCEDURE — 59514 CESAREAN DELIVERY ONLY: CPT | Mod: GB,,, | Performed by: STUDENT IN AN ORGANIZED HEALTH CARE EDUCATION/TRAINING PROGRAM

## 2023-12-06 PROCEDURE — 0502F PR SUBSEQUENT PRENATAL CARE: ICD-10-PCS | Mod: ,,, | Performed by: STUDENT IN AN ORGANIZED HEALTH CARE EDUCATION/TRAINING PROGRAM

## 2023-12-06 PROCEDURE — 0502F SUBSEQUENT PRENATAL CARE: CPT | Mod: ,,, | Performed by: STUDENT IN AN ORGANIZED HEALTH CARE EDUCATION/TRAINING PROGRAM

## 2023-12-07 PROCEDURE — 99231 PR SUBSEQUENT HOSPITAL CARE,LEVL I: ICD-10-PCS | Mod: ,,, | Performed by: STUDENT IN AN ORGANIZED HEALTH CARE EDUCATION/TRAINING PROGRAM

## 2023-12-07 PROCEDURE — 99231 SBSQ HOSP IP/OBS SF/LOW 25: CPT | Mod: ,,, | Performed by: STUDENT IN AN ORGANIZED HEALTH CARE EDUCATION/TRAINING PROGRAM

## 2023-12-08 ENCOUNTER — OUTSIDE PLACE OF SERVICE (OUTPATIENT)
Dept: OBSTETRICS AND GYNECOLOGY | Facility: CLINIC | Age: 33
End: 2023-12-08
Payer: MEDICAID

## 2023-12-08 PROCEDURE — 99238 HOSP IP/OBS DSCHRG MGMT 30/<: CPT | Mod: ,,, | Performed by: OBSTETRICS & GYNECOLOGY

## 2023-12-08 PROCEDURE — 99238 PR HOSPITAL DISCHARGE DAY,<30 MIN: ICD-10-PCS | Mod: ,,, | Performed by: OBSTETRICS & GYNECOLOGY

## 2023-12-11 RX ORDER — BLOOD SUGAR DIAGNOSTIC
STRIP MISCELLANEOUS
Qty: 200 STRIP | Refills: 6 | Status: SHIPPED | OUTPATIENT
Start: 2023-12-11

## 2023-12-15 ENCOUNTER — POSTPARTUM VISIT (OUTPATIENT)
Dept: OBSTETRICS AND GYNECOLOGY | Facility: CLINIC | Age: 33
End: 2023-12-15
Payer: MEDICAID

## 2023-12-15 VITALS
HEART RATE: 75 BPM | WEIGHT: 135 LBS | SYSTOLIC BLOOD PRESSURE: 101 MMHG | BODY MASS INDEX: 22.47 KG/M2 | DIASTOLIC BLOOD PRESSURE: 64 MMHG

## 2023-12-15 DIAGNOSIS — Z00.00 ENCOUNTER FOR MEDICAL EXAMINATION TO ESTABLISH CARE: ICD-10-CM

## 2023-12-15 DIAGNOSIS — Z98.891 STATUS POST C-SECTION: Primary | ICD-10-CM

## 2023-12-15 PROCEDURE — 59430 PR CARE AFTER DELIVERY ONLY: ICD-10-PCS | Mod: S$GLB,,, | Performed by: STUDENT IN AN ORGANIZED HEALTH CARE EDUCATION/TRAINING PROGRAM

## 2023-12-15 NOTE — PROGRESS NOTES
Chief Complaint: post op    HPI: Patient is a 33 y.o. y.o. female  who presents to clinic for 1 week postop  visit.  Patient doing well today.  She reports appropriate pain control medication, lochia minimal.  Glucoses control with her new insulin regimen.  She is ambulating, voiding, tolerating p.o. diet without difficulty.  She has no other complaints today.  Review of systems negative unless mentioned above..    Physical Exam:  Vitals:    12/15/23 0914   BP: 101/64   Pulse: 75   Weight: 61.2 kg (135 lb)     Gen: NAD, well developed, well nourished  Psych: alert and oriented x 3, normal affect  HEENT: normocephalic, atraumatic  Abd: soft, non-tender, non-distended, Pfannenstiel incision healing well, majority incision with shelving.  No signs of infection appreciated  Skin: warm and dry  Ext: no c/c/c, moves all extremities  Neurological: normal gait, gross motor function intact    Assessment: Patient is a 33 y.o. y.o. female  with  Patient Active Problem List   Diagnosis    Encounter for supervision of normal pregnancy in first trimester    H/O:     Abnormal O'Sawyer glucose challenge test, antepartum    Insulin controlled gestational diabetes mellitus (GDM) in third trimester       Plan:  Patient doing well postop   Patient to continue postop restrictions   Patient counseled on wound care  Continue current insulin regimen, morning NPH and regular decreased by 5 units   Patient referred for establishment of care with primary care  Return to clinic in 5 weeks for 6 week postpartum visit or sooner if needed    Forrest Del Valle MD

## 2024-01-18 ENCOUNTER — POSTPARTUM VISIT (OUTPATIENT)
Dept: OBSTETRICS AND GYNECOLOGY | Facility: CLINIC | Age: 34
End: 2024-01-18
Payer: MEDICAID

## 2024-01-18 VITALS
WEIGHT: 126 LBS | BODY MASS INDEX: 20.97 KG/M2 | DIASTOLIC BLOOD PRESSURE: 71 MMHG | HEART RATE: 90 BPM | SYSTOLIC BLOOD PRESSURE: 105 MMHG

## 2024-01-18 DIAGNOSIS — Z98.891 STATUS POST C-SECTION: ICD-10-CM

## 2024-01-18 DIAGNOSIS — O24.414 INSULIN CONTROLLED GESTATIONAL DIABETES MELLITUS (GDM) IN THIRD TRIMESTER: Primary | ICD-10-CM

## 2024-01-18 PROCEDURE — 0503F POSTPARTUM CARE VISIT: CPT | Mod: S$GLB,,, | Performed by: STUDENT IN AN ORGANIZED HEALTH CARE EDUCATION/TRAINING PROGRAM

## 2024-01-18 NOTE — PROGRESS NOTES
Patient is a 33-year-old black female status post repeat  present clinic for 6 week postpartum visit.  Patient was found to have diabetes during the pregnancy currently on insulin.  She reports her glucoses well controlled with her current insulin regimen.  She was referred to family medicine for continued follow up.  She reports recently having her cycle.  She is formula feeding without difficulty.  And she is requesting the Nexplanon for contraception.  She has no other complaints today.  She denies VB/VD/dysuria/Denies any HA, vision changes, F/C, LE swelling. Denies any breast pain/soreness. Denies postpartum depression.      Vitals:    24 1136   BP: 105/71   Pulse: 90   Weight: 57.2 kg (126 lb)     Body mass index is 20.97 kg/m².  Gen:WDWN in NAD  NC/AT  Non labored breathing   Abd soft, NT/ND  Skin: warm and dry  Ext no edema      Patient Active Problem List   Diagnosis    Encounter for supervision of normal pregnancy in first trimester    H/O:     Abnormal O'Sawyer glucose challenge test, antepartum    Insulin controlled gestational diabetes mellitus (GDM) in third trimester       Plan   Patient doing well postop   Patient released from postop restrictions   Patient referred to pregnancy medicine for continued treatment of her diabetes   Patient desires Nexplanon, paperwork filled out today and will notify patient of arrival   Return to clinic for Nexplanon insertion

## 2024-03-13 ENCOUNTER — PATIENT MESSAGE (OUTPATIENT)
Dept: OBSTETRICS AND GYNECOLOGY | Facility: CLINIC | Age: 34
End: 2024-03-13
Payer: MEDICAID

## 2024-03-25 ENCOUNTER — OFFICE VISIT (OUTPATIENT)
Dept: OBSTETRICS AND GYNECOLOGY | Facility: CLINIC | Age: 34
End: 2024-03-25
Payer: MEDICAID

## 2024-03-25 VITALS
BODY MASS INDEX: 19.97 KG/M2 | DIASTOLIC BLOOD PRESSURE: 59 MMHG | HEART RATE: 66 BPM | SYSTOLIC BLOOD PRESSURE: 96 MMHG | WEIGHT: 120 LBS

## 2024-03-25 DIAGNOSIS — Z30.017 NEXPLANON INSERTION: Primary | ICD-10-CM

## 2024-03-25 PROCEDURE — 11981 INSERTION DRUG DLVR IMPLANT: CPT | Mod: S$GLB,,, | Performed by: STUDENT IN AN ORGANIZED HEALTH CARE EDUCATION/TRAINING PROGRAM

## 2024-03-25 PROCEDURE — 99499 UNLISTED E&M SERVICE: CPT | Mod: S$GLB,,, | Performed by: STUDENT IN AN ORGANIZED HEALTH CARE EDUCATION/TRAINING PROGRAM

## 2024-03-25 NOTE — PROCEDURES
Insertion of Nexplanon    Date/Time: 3/25/2024 8:30 AM    Performed by: Forrest Del Valle MD  Authorized by: Forrest Del Valle MD    Consent:   Consent obtained:  Prior to procedure the appropriate consent was completed and verified  Consent given by:  Patient  Patient questions answered: yes    Patient agrees, verbalizes understanding, and wants to proceed: yes    Educational handouts given: yes    Instructions and paperwork completed: yes    Pre-Procedure:   Pre-procedure timeout performed: yes    Prepped with: povidone-iodine    Local anesthetic:  Lidocaine with epinephrine  The site was cleaned and prepped in a sterile fashion: yes    Insertion Procedure:   Small stab incision was made in arm: yes    Left/right:  left arm   68 mg etonogestreL 68 mg  Preloaded Implanon trocar was placed subdermally: yes    Visualization of implant was obtained: yes    Nexplanon was inserted and trocar removed: yes    Visualization of notch in stilette and palpitation of device: yes    Palpitation confirms placement by provider and patient: yes    Site was closed with steri-strips and pressure bandage applied: yes

## 2024-04-15 ENCOUNTER — OFFICE VISIT (OUTPATIENT)
Dept: PRIMARY CARE CLINIC | Facility: CLINIC | Age: 34
End: 2024-04-15
Payer: MEDICAID

## 2024-04-15 ENCOUNTER — PATIENT MESSAGE (OUTPATIENT)
Dept: PRIMARY CARE CLINIC | Facility: CLINIC | Age: 34
End: 2024-04-15

## 2024-04-15 VITALS
RESPIRATION RATE: 16 BRPM | DIASTOLIC BLOOD PRESSURE: 60 MMHG | SYSTOLIC BLOOD PRESSURE: 100 MMHG | HEIGHT: 65 IN | BODY MASS INDEX: 19.66 KG/M2 | WEIGHT: 118 LBS | HEART RATE: 61 BPM | TEMPERATURE: 97 F | OXYGEN SATURATION: 98 %

## 2024-04-15 DIAGNOSIS — O24.414 INSULIN CONTROLLED GESTATIONAL DIABETES MELLITUS (GDM) IN THIRD TRIMESTER: ICD-10-CM

## 2024-04-15 DIAGNOSIS — Z00.00 ENCOUNTER FOR MEDICAL EXAMINATION TO ESTABLISH CARE: ICD-10-CM

## 2024-04-15 DIAGNOSIS — Z00.00 PREVENTATIVE HEALTH CARE: Primary | ICD-10-CM

## 2024-04-15 DIAGNOSIS — R73.9 HYPERGLYCEMIA: Primary | ICD-10-CM

## 2024-04-15 LAB
ALBUMIN SERPL BCP-MCNC: 3.7 G/DL (ref 3.4–5)
ALBUMIN/GLOBULIN RATIO: 0.8 RATIO (ref 1.1–1.8)
ALP SERPL-CCNC: 124 U/L (ref 46–116)
ALT SERPL W P-5'-P-CCNC: 24 U/L (ref 12–78)
ANION GAP SERPL CALC-SCNC: 7 MMOL/L (ref 3–11)
AST SERPL-CCNC: 8 U/L (ref 15–37)
BASOPHILS NFR BLD: 0.2 % (ref 0–3)
BILIRUB SERPL-MCNC: 0.8 MG/DL (ref 0–1)
BUN SERPL-MCNC: 13 MG/DL (ref 7–18)
BUN/CREAT SERPL: 18.57 RATIO (ref 7–18)
CALCIUM SERPL-MCNC: 8.6 MG/DL (ref 8.8–10.5)
CHLORIDE SERPL-SCNC: 102 MMOL/L (ref 100–108)
CHOLEST SERPL-MSCNC: 132 MG/DL
CO2 SERPL-SCNC: 28 MMOL/L (ref 21–32)
CREAT SERPL-MCNC: 0.7 MG/DL (ref 0.55–1.02)
EOSINOPHIL NFR BLD: 0.2 % (ref 1–3)
ERYTHROCYTE [DISTWIDTH] IN BLOOD BY AUTOMATED COUNT: 14 % (ref 12.5–18)
GFR ESTIMATION: > 60
GLOBULIN: 4.6 G/DL (ref 2.3–3.5)
GLUCOSE SERPL-MCNC: 95 MG/DL (ref 70–110)
HBA1C MFR BLD: 6.2 % (ref 4.5–6.2)
HCT VFR BLD AUTO: 32.3 % (ref 37–47)
HDL/CHOLESTEROL RATIO: 3.3 RATIO
HDLC SERPL-MCNC: 40 MG/DL (ref 39–96)
HGB BLD-MCNC: 10.3 G/DL (ref 12–16)
LDLC SERPL CALC-MCNC: 81.4 MG/DL
LYMPHOCYTES NFR BLD: 28.5 % (ref 25–40)
MCH RBC QN AUTO: 26.4 PG (ref 27–31.2)
MCHC RBC AUTO-ENTMCNC: 31.9 G/DL (ref 31.8–35.4)
MCV RBC AUTO: 82.8 FL (ref 80–97)
MONOCYTES NFR BLD: 6.8 % (ref 1–15)
NEUTROPHILS # BLD AUTO: 3.56 10*3/UL (ref 1.8–7.7)
NEUTROPHILS NFR BLD: 64.1 % (ref 37–80)
NUCLEATED RED BLOOD CELLS: 0 %
PLATELETS: 292 10*3/UL (ref 142–424)
POTASSIUM SERPL-SCNC: 3.6 MMOL/L (ref 3.6–5.2)
PROT SERPL-MCNC: 8.3 G/DL (ref 6.4–8.2)
RBC # BLD AUTO: 3.9 10*6/UL (ref 4.2–5.4)
SODIUM BLD-SCNC: 137 MMOL/L (ref 135–145)
T4 FREE SP9 P CHAL SERPL-SCNC: 1.08 NG/DL (ref 0.76–1.46)
TRIGL SERPL-MCNC: 53 MG/DL (ref 30–200)
TSH SERPL DL<=0.005 MIU/L-ACNC: 0.84 UIU/ML (ref 0.36–3.74)
VLDL CHOLESTEROL: 11 MG/DL (ref 0–40)
WBC # BLD: 5.6 10*3/UL (ref 4.6–10.2)

## 2024-04-15 PROCEDURE — 99204 OFFICE O/P NEW MOD 45 MIN: CPT | Mod: S$GLB,,, | Performed by: FAMILY MEDICINE

## 2024-04-15 PROCEDURE — 1159F MED LIST DOCD IN RCRD: CPT | Mod: CPTII,S$GLB,, | Performed by: FAMILY MEDICINE

## 2024-04-15 PROCEDURE — 3074F SYST BP LT 130 MM HG: CPT | Mod: CPTII,S$GLB,, | Performed by: FAMILY MEDICINE

## 2024-04-15 PROCEDURE — 3078F DIAST BP <80 MM HG: CPT | Mod: CPTII,S$GLB,, | Performed by: FAMILY MEDICINE

## 2024-04-15 PROCEDURE — 3008F BODY MASS INDEX DOCD: CPT | Mod: CPTII,S$GLB,, | Performed by: FAMILY MEDICINE

## 2024-04-15 PROCEDURE — 1160F RVW MEDS BY RX/DR IN RCRD: CPT | Mod: CPTII,S$GLB,, | Performed by: FAMILY MEDICINE

## 2024-04-15 NOTE — PROGRESS NOTES
Subjective     Patient ID: Katherine Rivas is a 33 y.o. female.    Chief Complaint: Establish Care (Patient states she has Gestational diabetes but they think it was not because of pregnancy.  )    HPI    Here to [facility] care  She had gest dm which was difficult to control in pregnancy, no issues with prior pregnancies  She reports some blurry vision and ext numbness  Has been checking bg and it remains around 100 ion am since delivery and lower 100's after meals  No fam hx dm  She was on nph while pregnant    Review of Systems   Constitutional:  Negative for chills, diaphoresis, fatigue, fever and unexpected weight change.   HENT:  Negative for nasal congestion, hearing loss, rhinorrhea, sinus pressure/congestion, sore throat, trouble swallowing and voice change.    Eyes:  Negative for pain and visual disturbance.   Respiratory:  Negative for cough, chest tightness, shortness of breath and wheezing.    Cardiovascular:  Negative for chest pain, palpitations and leg swelling.   Gastrointestinal:  Negative for abdominal pain, constipation, diarrhea, nausea and vomiting.   Genitourinary:  Negative for decreased urine volume, dysuria, flank pain, frequency, hematuria, pelvic pain, vaginal bleeding and vaginal discharge.   Musculoskeletal:  Negative for arthralgias, back pain, myalgias and neck pain.   Integumentary:  Negative for color change, pallor and rash.   Neurological:  Negative for dizziness, syncope, weakness, light-headedness and headaches.   Hematological:  Negative for adenopathy.   Psychiatric/Behavioral:  Negative for decreased concentration, dysphoric mood and sleep disturbance. The patient is not nervous/anxious.           Objective     Physical Exam  Vitals reviewed.   Constitutional:       General: She is not in acute distress.     Appearance: She is well-developed. She is not diaphoretic.   HENT:      Head: Normocephalic and atraumatic.      Nose: Nose normal.      Mouth/Throat:      Mouth: Mucous  membranes are moist.      Pharynx: Oropharynx is clear.   Eyes:      Conjunctiva/sclera: Conjunctivae normal.      Pupils: Pupils are equal, round, and reactive to light.   Neck:      Thyroid: No thyromegaly.      Vascular: No JVD.   Cardiovascular:      Rate and Rhythm: Normal rate and regular rhythm.      Pulses: Normal pulses.      Heart sounds: Normal heart sounds. No murmur heard.     No friction rub. No gallop.   Pulmonary:      Effort: Pulmonary effort is normal. No respiratory distress.      Breath sounds: Normal breath sounds. No stridor. No wheezing or rales.   Abdominal:      General: Bowel sounds are normal. There is no distension.      Palpations: Abdomen is soft.      Tenderness: There is no abdominal tenderness.   Musculoskeletal:         General: No swelling or tenderness.      Cervical back: Normal range of motion and neck supple.      Right lower leg: No edema.      Left lower leg: No edema.   Lymphadenopathy:      Cervical: No cervical adenopathy.   Skin:     General: Skin is warm and dry.      Coloration: Skin is not pale.      Findings: No erythema or rash.   Neurological:      Mental Status: She is alert and oriented to person, place, and time.   Psychiatric:         Mood and Affect: Mood normal.         Behavior: Behavior normal.            Assessment and Plan     1. Preventative health care  -     CBC Auto Differential; Future; Expected date: 04/15/2024  -     Comprehensive Metabolic Panel; Future; Expected date: 04/15/2024  -     TSH; Future; Expected date: 04/15/2024  -     Lipid Panel; Future; Expected date: 04/15/2024  -     T4, Free; Future; Expected date: 04/15/2024  -     CBC Auto Differential; Future; Expected date: 04/15/2024  -     Comprehensive Metabolic Panel; Future; Expected date: 04/15/2024  -     TSH; Future; Expected date: 04/15/2024  -     Lipid Panel; Future; Expected date: 04/15/2024  -     Hemoglobin A1C; Future; Expected date: 04/15/2024  -     Urinalysis; Future;  Expected date: 04/15/2024  -     T4, Free; Future; Expected date: 04/15/2024    2. Encounter for medical examination to establish care  -     Ambulatory referral/consult to Internal Medicine    3. Insulin controlled gestational diabetes mellitus (GDM) in third trimester  -     Ambulatory referral/consult to Internal Medicine  -     Hemoglobin A1C, POCT               No follow-ups on file.

## 2024-04-17 RX ORDER — METFORMIN HYDROCHLORIDE 500 MG/1
500 TABLET, EXTENDED RELEASE ORAL
Qty: 90 TABLET | Refills: 3 | Status: SHIPPED | OUTPATIENT
Start: 2024-04-17 | End: 2025-04-17

## 2024-11-18 ENCOUNTER — OFFICE VISIT (OUTPATIENT)
Dept: PRIMARY CARE CLINIC | Facility: CLINIC | Age: 34
End: 2024-11-18
Payer: MEDICAID

## 2024-11-18 VITALS
OXYGEN SATURATION: 98 % | WEIGHT: 120 LBS | BODY MASS INDEX: 19.99 KG/M2 | RESPIRATION RATE: 16 BRPM | DIASTOLIC BLOOD PRESSURE: 60 MMHG | HEIGHT: 65 IN | SYSTOLIC BLOOD PRESSURE: 100 MMHG | HEART RATE: 78 BPM | TEMPERATURE: 98 F

## 2024-11-18 DIAGNOSIS — R73.03 PREDIABETES: ICD-10-CM

## 2024-11-18 DIAGNOSIS — Z00.00 PREVENTATIVE HEALTH CARE: Primary | ICD-10-CM

## 2024-11-18 LAB — HBA1C MFR BLD: 5.4 %

## 2024-11-18 PROCEDURE — 3074F SYST BP LT 130 MM HG: CPT | Mod: CPTII,,, | Performed by: FAMILY MEDICINE

## 2024-11-18 PROCEDURE — 3008F BODY MASS INDEX DOCD: CPT | Mod: CPTII,,, | Performed by: FAMILY MEDICINE

## 2024-11-18 PROCEDURE — 3078F DIAST BP <80 MM HG: CPT | Mod: CPTII,,, | Performed by: FAMILY MEDICINE

## 2024-11-18 PROCEDURE — 3044F HG A1C LEVEL LT 7.0%: CPT | Mod: CPTII,,, | Performed by: FAMILY MEDICINE

## 2024-11-18 PROCEDURE — 99395 PREV VISIT EST AGE 18-39: CPT | Mod: S$PBB,,, | Performed by: FAMILY MEDICINE

## 2024-11-18 PROCEDURE — 1159F MED LIST DOCD IN RCRD: CPT | Mod: CPTII,,, | Performed by: FAMILY MEDICINE

## 2024-11-18 PROCEDURE — 1160F RVW MEDS BY RX/DR IN RCRD: CPT | Mod: CPTII,,, | Performed by: FAMILY MEDICINE

## 2024-11-18 RX ORDER — METFORMIN HYDROCHLORIDE 500 MG/1
500 TABLET, EXTENDED RELEASE ORAL 2 TIMES DAILY WITH MEALS
Qty: 180 TABLET | Refills: 3 | Status: SHIPPED | OUTPATIENT
Start: 2024-11-18 | End: 2025-11-18

## 2024-11-18 NOTE — PROGRESS NOTES
Subjective     Patient ID: Katherine Rivas is a 34 y.o. female.    Chief Complaint: Follow-up (3 month )    HPI    Wellness  She is doing well on metformin  Reviewed A1c and labs  Utd pap and vacs    Review of Systems   Constitutional:  Negative for chills, diaphoresis, fatigue, fever and unexpected weight change.   HENT:  Negative for nasal congestion, hearing loss, rhinorrhea, sinus pressure/congestion, sore throat, trouble swallowing and voice change.    Eyes:  Negative for pain and visual disturbance.   Respiratory:  Negative for cough, chest tightness, shortness of breath and wheezing.    Cardiovascular:  Negative for chest pain, palpitations and leg swelling.   Gastrointestinal:  Negative for abdominal pain, constipation, diarrhea, nausea and vomiting.   Genitourinary:  Negative for decreased urine volume, dysuria, flank pain, frequency, hematuria, pelvic pain, vaginal bleeding and vaginal discharge.   Musculoskeletal:  Negative for arthralgias, back pain, myalgias and neck pain.   Integumentary:  Negative for color change, pallor and rash.   Neurological:  Negative for dizziness, syncope, weakness, light-headedness and headaches.   Hematological:  Negative for adenopathy.   Psychiatric/Behavioral:  Negative for decreased concentration, dysphoric mood and sleep disturbance. The patient is not nervous/anxious.           Objective     Physical Exam  Vitals reviewed.   Constitutional:       General: She is not in acute distress.     Appearance: She is well-developed. She is not diaphoretic.   HENT:      Head: Normocephalic and atraumatic.      Nose: Nose normal.      Mouth/Throat:      Mouth: Mucous membranes are moist.      Pharynx: Oropharynx is clear.   Eyes:      Conjunctiva/sclera: Conjunctivae normal.      Pupils: Pupils are equal, round, and reactive to light.   Neck:      Thyroid: No thyromegaly.      Vascular: No JVD.   Cardiovascular:      Rate and Rhythm: Normal rate and regular rhythm.      Pulses:  Normal pulses.      Heart sounds: Normal heart sounds. No murmur heard.     No friction rub. No gallop.   Pulmonary:      Effort: Pulmonary effort is normal. No respiratory distress.      Breath sounds: Normal breath sounds. No stridor. No wheezing or rales.   Abdominal:      General: Bowel sounds are normal. There is no distension.      Palpations: Abdomen is soft.      Tenderness: There is no abdominal tenderness.   Musculoskeletal:         General: No swelling or tenderness.      Cervical back: Normal range of motion and neck supple.      Right lower leg: No edema.      Left lower leg: No edema.   Lymphadenopathy:      Cervical: No cervical adenopathy.   Skin:     General: Skin is warm and dry.      Coloration: Skin is not pale.      Findings: No erythema or rash.   Neurological:      Mental Status: She is alert and oriented to person, place, and time.   Psychiatric:         Mood and Affect: Mood normal.         Behavior: Behavior normal.            Assessment and Plan     1. Preventative health care    2. Prediabetes  -     metFORMIN (GLUCOPHAGE-XR) 500 MG ER 24hr tablet; Take 1 tablet (500 mg total) by mouth 2 (two) times daily with meals.  Dispense: 180 tablet; Refill: 3  -     Hemoglobin A1C, POCT               No follow-ups on file.

## 2025-01-31 ENCOUNTER — PATIENT MESSAGE (OUTPATIENT)
Dept: PRIMARY CARE CLINIC | Facility: CLINIC | Age: 35
End: 2025-01-31
Payer: MEDICAID

## 2025-02-18 ENCOUNTER — TELEPHONE (OUTPATIENT)
Facility: CLINIC | Age: 35
End: 2025-02-18
Payer: MEDICAID

## 2025-02-18 NOTE — TELEPHONE ENCOUNTER
----- Message from Veronica Mcclendon PA-C sent at 2/18/2025 10:55 AM CST -----  Yes,  just let her know that I am leaving  but I can try to get her some help in the mean time  ----- Message -----  From: Geetha Phillips MA  Sent: 2/18/2025  10:04 AM CST  To: Veronica Mcclendon PA-C    Can we see her since she is diabetic in the mean time or should I give her the number to [facility]?  ----- Message -----  From: Teresa Campos MA  Sent: 2/18/2025   8:15 AM CST  To: Geetha Phillips MA      ----- Message -----  From: Bridgett Bolanos  Sent: 2/18/2025   8:13 AM CST  To: [facility]    Patient is former patient of Celina Marcus and would like schedule appointment she is a diabetic please call her back at [phone]

## 2025-02-18 NOTE — TELEPHONE ENCOUNTER
Informed patient that Veronica said that she will see her as a patient but to let her know that she is leaving in June. Patient said that she might just go back to [facility]   I told her it is up to her. She verbalized understanding